# Patient Record
Sex: MALE | Race: WHITE | Employment: PART TIME | URBAN - METROPOLITAN AREA
[De-identification: names, ages, dates, MRNs, and addresses within clinical notes are randomized per-mention and may not be internally consistent; named-entity substitution may affect disease eponyms.]

---

## 2022-01-03 ENCOUNTER — VIRTUAL VISIT (OUTPATIENT)
Dept: PRIMARY CARE CLINIC | Age: 27
End: 2022-01-03
Payer: MEDICAID

## 2022-01-03 DIAGNOSIS — S82.891A CLOSED FRACTURE OF RIGHT ANKLE, INITIAL ENCOUNTER: Primary | ICD-10-CM

## 2022-01-03 PROCEDURE — 4004F PT TOBACCO SCREEN RCVD TLK: CPT | Performed by: STUDENT IN AN ORGANIZED HEALTH CARE EDUCATION/TRAINING PROGRAM

## 2022-01-03 PROCEDURE — G8421 BMI NOT CALCULATED: HCPCS | Performed by: STUDENT IN AN ORGANIZED HEALTH CARE EDUCATION/TRAINING PROGRAM

## 2022-01-03 PROCEDURE — G8427 DOCREV CUR MEDS BY ELIG CLIN: HCPCS | Performed by: STUDENT IN AN ORGANIZED HEALTH CARE EDUCATION/TRAINING PROGRAM

## 2022-01-03 PROCEDURE — 99203 OFFICE O/P NEW LOW 30 MIN: CPT | Performed by: STUDENT IN AN ORGANIZED HEALTH CARE EDUCATION/TRAINING PROGRAM

## 2022-01-03 PROCEDURE — G8484 FLU IMMUNIZE NO ADMIN: HCPCS | Performed by: STUDENT IN AN ORGANIZED HEALTH CARE EDUCATION/TRAINING PROGRAM

## 2022-01-03 SDOH — ECONOMIC STABILITY: FOOD INSECURITY: WITHIN THE PAST 12 MONTHS, YOU WORRIED THAT YOUR FOOD WOULD RUN OUT BEFORE YOU GOT MONEY TO BUY MORE.: NEVER TRUE

## 2022-01-03 SDOH — ECONOMIC STABILITY: FOOD INSECURITY: WITHIN THE PAST 12 MONTHS, THE FOOD YOU BOUGHT JUST DIDN'T LAST AND YOU DIDN'T HAVE MONEY TO GET MORE.: NEVER TRUE

## 2022-01-03 ASSESSMENT — PATIENT HEALTH QUESTIONNAIRE - PHQ9
SUM OF ALL RESPONSES TO PHQ QUESTIONS 1-9: 0
SUM OF ALL RESPONSES TO PHQ QUESTIONS 1-9: 0
1. LITTLE INTEREST OR PLEASURE IN DOING THINGS: 0
SUM OF ALL RESPONSES TO PHQ9 QUESTIONS 1 & 2: 0
SUM OF ALL RESPONSES TO PHQ QUESTIONS 1-9: 0
SUM OF ALL RESPONSES TO PHQ QUESTIONS 1-9: 0
2. FEELING DOWN, DEPRESSED OR HOPELESS: 0

## 2022-01-03 ASSESSMENT — SOCIAL DETERMINANTS OF HEALTH (SDOH): HOW HARD IS IT FOR YOU TO PAY FOR THE VERY BASICS LIKE FOOD, HOUSING, MEDICAL CARE, AND HEATING?: NOT HARD AT ALL

## 2022-01-03 ASSESSMENT — ENCOUNTER SYMPTOMS
WHEEZING: 0
SHORTNESS OF BREATH: 0
ABDOMINAL PAIN: 0

## 2022-01-03 NOTE — PROGRESS NOTES
Brandon Bowman (:  1995) is a 32 y.o. male,New patient, here for evaluation of the following chief complaint(s): New Patient (Np to establish ) and Ankle Injury (R ankle injured x months, requesting for PT referral.)         ASSESSMENT/PLAN:  1. Closed fracture of right ankle, initial encounter  -     [address]      Return if symptoms worsen or fail to improve. SUBJECTIVE/OBJECTIVE:  Patient presents requesting for referral to PT. Patient reports that he needs a primary provider to approve referral to PT. Back in 2021, he fell off stairs, fractured right ankle and broke left hand. He did undergo hand surgery but was placed on non weight bearing R ankle. He just came off non weight bearing a month ago and now needs PT. Surgeon is out of network but his insurance will cover for [employer]. Patient denies any pain currently. He is able to walk around wearing a parish. He has follow up appointment with Dr. Ronit Sykes (surgeon at [facility]) on 2022. Review of Systems   Constitutional: Negative for fever. Respiratory: Negative for shortness of breath and wheezing. Cardiovascular: Negative for chest pain and palpitations. Gastrointestinal: Negative for abdominal pain. Patient-Reported Vitals 1/3/2022   Patient-Reported Weight 275lb   Patient-Reported Height 6 2        Physical Exam  Constitutional:       General: He is not in acute distress. Appearance: Normal appearance. He is not ill-appearing or toxic-appearing. Neurological:      Mental Status: He is oriented to person, place, and time. Psychiatric:         Behavior: Behavior normal.           Brandon Bowman, was evaluated through a synchronous (real-time) audio-video encounter. The patient (or guardian if applicable) is aware that this is a billable service. Verbal consent to proceed was obtained today.  The visit was conducted pursuant to the emergency declaration under the [address] and the [address] authority and the [employer] ([employer]) and "[employer]ar General Act. Patient identification was verified, and a caregiver was present when appropriate. The patient was located in a state where the provider was credentialed to provide care. This dictation was generated by voice recognition computer software. Although all attempts are made to edit the dictation for accuracy, there may be errors in the transcription that are not intended. An electronic signature was used to authenticate this note.     --Jacqulynn Fothergill, MD

## 2022-01-04 ENCOUNTER — TELEPHONE (OUTPATIENT)
Dept: PRIMARY CARE CLINIC | Age: 27
End: 2022-01-04

## 2022-01-04 NOTE — TELEPHONE ENCOUNTER
Patient's girlfriend, Emerald Dietrich called in yesterday stating that they tried to schedule with PT and was told that our office needs to obtain a prior authorization for the PT eval.    I spoke to [facility] at [facility] today and initiated the PA, all pertinent information was given and the case is pending nurse review, this could take up to 48 hours. The pending reference [id number].

## 2022-01-12 ENCOUNTER — HOSPITAL ENCOUNTER (OUTPATIENT)
Dept: PHYSICAL THERAPY | Age: 27
Setting detail: THERAPIES SERIES
Discharge: HOME | End: 2022-01-12
Payer: MEDICAID

## 2022-01-12 PROCEDURE — 97161 PT EVAL LOW COMPLEX 20 MIN: CPT

## 2022-01-12 PROCEDURE — 97140 MANUAL THERAPY 1/> REGIONS: CPT

## 2022-01-12 PROCEDURE — 97110 THERAPEUTIC EXERCISES: CPT

## 2022-01-12 PROCEDURE — 97116 GAIT TRAINING THERAPY: CPT

## 2022-01-12 NOTE — FLOWSHEET NOTE
Sets/time Reps Notes / Cues   See HEP BELOW  20'                                                                    Therapeutic Activities (25554)       GAIT TRAINING (Without walking boot) In // bars: had pt practice wt shifting and then walking holding one bar in stocking feet working on heel strike and step thru 10'  1/12/22 Also advised pt on how to gradually wean out of the walking boot and to practice walking in his gym shoes at home                        Neuromuscular Re-ed (77554)                            Manual Intervention ([id number]) x 10'              PROM & gentle OP R ankle DF/PF, INV/EV 5'     Joint mobs Gr II-III distraction and post talar glide; metatarsal glides  5'     STM / IASTM                         Modalities:     Pt. 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  Date: 01/12/2022  Prepared by:  Monica Alarcon  Exercises  Seated Calf Towel Stretch - 2 x daily - 7 x weekly - 5 reps - 20-30 seconds hold  Seated Heel Raise - 1-2 x daily - 5-7 x weekly - 2-3 sets - 10 reps  Seated Heel Toe Raises - 1-2 x daily - 5-7 x weekly - 2-3 sets - 10 reps  Seated Knee Flexion Stretch - 1-2 x daily - 5-7 x weekly - 10 reps - 10 seconds hold  Standing Weight Shift - 1-2 x daily - 5-7 x weekly - 2-3 sets - 10 reps  Standing Weight Shifting Forward and Backward (in stagger stance) - 1-2 x daily - 7 x weekly - 2-3 sets - 10 reps             Therapeutic Exercise and NMR EXR  [x] (36662) Provided verbal/tactile cueing for activities related to strengthening, flexibility, endurance, ROM for improvements in LE, proximal hip, and core control with self care, mobility, lifting, ambulation.  [] (92601) Provided verbal/tactile cueing for activities related to improving balance, coordination, kinesthetic sense, posture, motor skill, proprioception  to assist with LE, proximal hip, and core control in self care, mobility, lifting, ambulation and eccentric single leg control.   [] (97305) Therapist is in constant attendance of 2 or more patients providing skilled therapy interventions, but not providing any significant amount of measurable one-on-one time to either patient, for improvements in LE, proximal hip, and core control in self care, mobility, lifting, ambulation and eccentric single leg control.      NMR and Therapeutic Activities:    [] (51935 or 48985) Provided verbal/tactile cueing for activities related to improving balance, coordination, kinesthetic sense, posture, motor skill, proprioception and motor activation to allow for proper function of core, proximal hip and LE with self care and ADLs  [] (69698) Gait Re-education- Provided training and instruction to the patient for proper LE, core and proximal hip recruitment and positioning and eccentric body weight control with ambulation re-education including up and down stairs     Home Exercise Program:    [x] (72856) Reviewed/Progressed HEP activities related to strengthening, flexibility, endurance, ROM of core, proximal hip and LE for functional self-care, mobility, lifting and ambulation/stair navigation   [] (26971)Reviewed/Progressed HEP activities related to improving balance, coordination, kinesthetic sense, posture, motor skill, proprioception of core, proximal hip and LE for self care, mobility, lifting, and ambulation/stair navigation      Manual Treatments:  PROM / STM / Oscillations-Mobs:  G-I, II, III, IV (PA's, Inf., Post.)  [x] (96665) Provided manual therapy to mobilize LE, proximal hip and/or LS spine soft tissue/joints for the purpose of modulating pain, promoting relaxation,  increasing ROM, reducing/eliminating soft tissue swelling/inflammation/restriction, improving soft tissue extensibility and allowing for proper ROM for normal function with self care, mobility, lifting and ambulation. Modalities:  [] (25297) Vasopneumatic compression: Utilized vasopneumatic compression to decrease edema / swelling for the purpose of improving mobility and quad tone / recruitment which will allow for increased overall function including but not limited to self-care, transfers, ambulation, and ascending / descending stairs. Charges:  Timed Code Treatment Minutes: 40   Total Treatment Minutes: 65     [x] EVAL - LOW (38139)   [] EVAL - MOD (04721)  [] EVAL - HIGH (32928)  [] RE-EVAL (20250)  [x] GS(72369) x  1     [] Ionto  [] NMR (15874) x       [] Vaso  [x] Manual (36218) x  1     [] Ultrasound  [] TA x        [] Mech Traction (05690)  [] Aquatic Therapy x      [] ES (un) (83647):   [] Home Management Training x  [] ES(attended) (25884)   [] Dry Needling 1-2 muscles (56612):  [] Dry Needling 3+ muscles (061828  [] Group:      [x] Other: GAIT TRAINING (77371)    GOALS:   Patient stated goal: regain strength in order to return to work  []? Progressing: []? Met: []? Not Met: []? Adjusted     Therapist goals for Patient:   Short Term Goals: To be achieved in: 2 weeks  1. Independent in HEP and progression per patient tolerance, in order to prevent re-injury. []? Progressing: []? Met: []? Not Met: []? Adjusted  2. Patient will have a decrease in pain of R ankle and decreased fear of wt bearing so he can walk in a normal gym shoe to facilitate improvement in movement, function, and ADLs as indicated by Functional Deficits. []? Progressing: []? Met: []? Not Met: []? Adjusted     Long Term Goals: To be achieved in: 6-8 weeks  1. Disability index score of 20% or less for the LEFS to assist with reaching prior level of function. []? Progressing: []? Met: []? Not Met: []? Adjusted  2.  Patient will demonstrate increased AROM of R ankle DF and PF by at least 10 deg to allow for proper joint functioning for correct heel strike and push off during gait  []? Progressing: []? Met: []? Not Met: []? Adjusted  3. Patient will demonstrate an increase in Strength to at least 4+/5 in R ankle as well as good proximal hip strength and control to allow ability to ascend/descend stairs and ladders without difficulty  []? Progressing: []? Met: []? Not Met: []? Adjusted  4. Patient will return to functional activities including being able to stand at least 2 hours without increased symptoms or restriction. []? Progressing: []? Met: []? Not Met: []? Adjusted  5. Pt will be able to squat and lift heavier objects and walk without significant gait deviation    []? Progressing: []? Met: []? Not Met: []? Adjusted            Overall Progression Towards Functional goals/ Treatment Progress Update:  [] Patient is progressing as expected towards functional goals listed. [] Progression is slowed due to complexities/Impairments listed. [] Progression has been slowed due to co-morbidities. [x] Plan just implemented, too soon to assess goals progression <30days   [] Goals require adjustment due to lack of progress  [] Patient is not progressing as expected and requires additional follow up with physician  [] Other    Persisting Functional Limitations/Impairments:  []Sitting []Standing   [x]Walking []Stairs   [x]Transfers []ADLs   [x]Squatting/bending []Kneeling  [x]Housework []Job related tasks  [x]Driving []Sports/Recreation   []Sleeping []Other:    ASSESSMENT:  AT [facility] 1/12/22:  Pt presents to PT 4 months s/p sustaining a R ankle talus fracture. He has been immobilized all this time and he had a difficult time finding a PT clinic that accepted his insurance so he hasn't been doing any exercises or progressive functional wt bearing without the walking boot.  His pain levels are mostly tolerable but he is somewhat fearful of full wt bearing on the R ankle without the boot. He exhibits stiffness/hypomobility in TCJ ans subtalar jts and also 1st MTP along with decreased strength and proprioception and decreased ambulation status. 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 PT 2x / week for 6-8 weeks. [] Continue per plan of care [] Alter current plan (see comments)  [x] Plan of care initiated [] Hold pending MD visit [] Discharge    Electronically signed by: Torres Yousif, PT / DPT      Note: If patient does not return for scheduled/ recommended follow up visits, this note will serve as a discharge from care along with most recent update on progress.

## 2022-01-12 NOTE — PLAN OF CARE
[address], [address] [facility], [address]  Phone: [phone]   Fax: [phone]                                                       Physical Therapy Evaluation/Certification    Dear Referring Practitioner: Fabby Rizzo MD,    We had the pleasure of evaluating the following patient for physical therapy services at [address] [place] [address]. A summary of our findings can be found in the initial assessment below. This includes our plan of care. If you have any questions or concerns regarding these findings, please do not hesitate to contact me at the office phone number checked above.   Thank you for the referral.       Physician Signature:_______________________________Date:__________________  By signing above (or electronic signature), therapists plan is approved by physician      Patient: Luis Enrique Chaudhary   : 1995   MRN: [id number]  Referring Physician: Referring Practitioner: Fabby Rizzo MD (also send copy of notes to ortho Radha Valentin MD: phone # [phone])       Evaluation Date: 2022      Medical Diagnosis Information:  Diagnosis: J49.156W (ICD-10-CM) - Closed fracture of right ankle, initial encounter   Treatment Diagnosis: R ankle pain, decreased ROM/ flexibility, decreased strength, decreased weight bearing, decreased balance and ambulation s/p R talar fracture and immobilization                                         Insurance information: PT Insurance Information: Pose; all codes billable; 30 PT pcy     Precautions/ Contra-indications:  By  MD wanted him to be in a high top shoe but pt hasn't been able to get into PT due to insurance so pt is still full-time in walking boot  Latex Allergy:  [x]NO      []YES  Preferred Language for Healthcare:   [x]English       []Other:    C-SSRS Triggered by Intake questionnaire (Past 2 wk assessment ):   [x] No, Questionnaire did not trigger screening.   [] Yes, Patient intake triggered C-SSRS Screening     [] Completed, no further action required. [] Completed, PCP notified via Epic    SUBJECTIVE: Patient stated complaint: Pt injured 9/21/21 when he was going downstairs at his apartment and the stair broke and he fell through and fractured his R talus and his 4th metacarpal of his R hand. The talus fracture was managed non-operatively but an ORIF was done on his R hand. Pt was NWB on RLE for 12 wks with a platform walker. Pt is now in a walking boot WBAT. He had a couple sessions of home PT after he came home from hospital and he was given a couple ROM exercises to do but he hasn't been consistent with them lately He has tried to get into outpatient PT but has had problems getting approved at other places thru his insurance and finally got the ok to come here. He has not has not attempted to wean out of the brace because he was waiting to start PT. His current standing tolerance is about 20 minutes. Pt wants to be able to get back to his job. 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 Likes to hike    Living Status/Prior Level of Function:Prior to this injury / incident, pt was independent with ADLs and IADLs. Pt is using shower chair and when he is out of boot his wife has to help him get up because he has been fearful of WBng on RLE without the boot.  Pt is not yet driving due to being in the boot.       OBJECTIVE:   Palpation: mild TTP over R Achilles, stiffness in ankle    Functional Mobility/Transfers: IND w/ boot on    Posture: B hip ER and genu recurvatum    Bandages/Dressings/Incisions: n/a    Gait: (include devices/WB status)     Dermatomes Normal Abnormal Comments   inguinal area (L1)    ALL NORMAL   anterior mid-thigh (L2)      distal ant thigh/med knee (L3)      medial lower leg and foot (L4)      lateral lower leg and foot (L5)      posterior calf (S1)      medial calcaneus (S2)          Reflexes Normal Abnormal Comments   S1-2 Seated achilles   Not tested   S1-2 Prone knee bend      L3-4 Patellar tendon      Clonus      Babinski        Lumbar AROM screen: [] WFL  [] abnormal:     PROM AROM    L R L R   Hip Flexion   WNL WNL   Hip Abduction       Hip ER       Hip IR       Knee Flexion   WNL WNL   Knee Extension       Dorsiflexion   5* 8 2   Plantarflexion   50 50 42   Inversion   30 30 18   Eversion   28 25 25       Strength (0-5) / Myotomes Left Right   Hip Flexion - supine     Hip Flexion - seated (L1-2)     Hip Abduction     Hip Adduction     Hip ER     Hip IR     Quads (L2-4)     Hamstrings     Ankle Dorsiflexion (L4-5) 5 4   Ankle Plantarflexion (S1-2) 5 3-   Ankle Inversion 5 4   Ankle Eversion (S1-2) 5 4   Great Toe Extension (L5)          Flexibility     Hamstrings (90/90) -20 -30   ITB (Katalina)     Quads (Ely's)     Hip Flexor Ahmed Elm)          Girth     Mid patella     Suprapatellar     Figure 8     Transmalleolar     Metatarsal Heads         Joint mobility: dec post R talar glide limiting DF   []Normal    [x]Hypo   []Hyper    Orthopaedic Special Tests  Positive  Negative  NT Comments    Hip       KYLIE / Wally's       FADIR       Scour       Trendelenburg              Knee       Lachman's / Anterior Drawer       Posterior Drawer       Varus Stress       Valgus Stress       Darin's        Appley's       Thessaly's       Patellar Tracking              Ankle Anterior Drawer  x     Talar Tilt  x     Willis       Chago's   x                Balance: unable to test in SLB today                         [x] Patient history, allergies, meds reviewed. Medical chart reviewed. See intake form. Review Of Systems (ROS):  [x]Performed Review of systems (Integumentary, CardioPulmonary, Neurological) by intake and observation. Intake form has been scanned into medical record. Patient has been instructed to contact their primary care physician regarding ROS issues if not already being addressed at this time.       Co-morbidities/Complexities (which will affect course of rehabilitation):   []None        [x]Hx of COVID   Arthritic conditions   []Rheumatoid arthritis (M05.9)  []Osteoarthritis (M19.91)  []Gout   Cardiovascular conditions   []Hypertension (I10)  []Hyperlipidemia (E78.5)  []Angina pectoris (I20)  []Atherosclerosis (I70)  []Pacemaker  []Hx of CABG/stent/  cardiac surgeries   Musculoskeletal conditions   []Disc pathology   []Congenital spine pathologies   []Osteoporosis (M81.8)  []Osteopenia (M85.8)  []Scoliosis       Endocrine conditions   []Hypothyroid (E03.9)  []Hyperthyroid Gastrointestinal conditions   []Constipation (L39.28)   Metabolic conditions   [x]Morbid obesity (E66.01)  []Diabetes type 1(E10.65) or 2 (E11.65)   []Neuropathy (G60.9)     Cardio/Pulmonary conditions   []Asthma (J45)  []Coughing   []COPD (J44.9)  []CHF  []A-fib   Psychological Disorders  []Anxiety (F41.9)  []Depression (F32.9)   []Other:   Developmental Disorders  []Autism (F84.0)  []CP (G80)  []Down Syndrome (Q90.9)  []Developmental delay     Neurological conditions  []Prior Stroke (I69.30)  []Parkinson's (G20)  []Encephalopathy (G93.40)  []MS (G35)  []Post-polio (G14)  []SCI  []TBI  []ALS Other conditions  []Fibromyalgia (M79.7)  []Vertigo  []Syncope  []Kidney Failure  []Cancer      []currently undergoing                treatment  []Pregnancy  []Incontinence   Prior surgeries  []involved limb  []previous spinal surgery  [] section birth  []hysterectomy  []bowel / bladder surgery  []other relevant surgeries   []Other:              Barriers to/and or personal factors that will affect rehab potential:              []Age  []Sex    [x]Smoker              []Motivation/Lack of Motivation                        []Co-Morbidities              []Cognitive Function, education/learning barriers              []Environmental, home barriers              [x]profession/work barriers  []past PT/medical experience  []other:  Justification:     Falls Risk Assessment (30 days):    [x] Falls Risk assessed and no intervention required. [] Falls Risk assessed and Patient requires intervention due to being higher risk   TUG score (>12s at risk):     [] Falls education provided, including        ASSESSMENT: AT [facility]:  Pt presents to PT 4 months s/p sustaining a R ankle talus fracture. He has been immobilized all this time and he had a difficult time finding a PT clinic that accepted his insurance so he hasn't been doing any exercises or progressive functional wt bearing without the walking boot. His pain levels are mostly tolerable but he is somewhat fearful of full wt bearing on the R ankle without the boot. He exhibits stiffness/hypomobility in TCJ ans subtalar jts and also 1st MTP along with decreased strength and proprioception and decreased ambulation status.   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)    Functional Impairments:     []Noted lumbar/proximal hip/LE joint hypomobility   [x]Decreased LE functional ROM   []Decreased core/proximal hip strength and neuromuscular control   [x]Decreased LE functional strength   [x]Reduced balance/proprioceptive control   []other:      Functional Activity Limitations (from functional questionnaire and intake)   [x]Reduced ability to tolerate prolonged functional positions   []Reduced ability or difficulty with changes of positions or transfers between positions   []Reduced ability to maintain good posture and demonstrate good body mechanics with sitting, bending, and lifting   []Reduced ability to sleep   [] Reduced ability or tolerance with driving and/or computer work   []Reduced ability to perform lifting, carrying tasks   [x]Reduced ability to squat   []Reduced ability to forward bend   [x]Reduced ability to ambulate prolonged functional periods/distances/surfaces   []Reduced ability to ascend/descend stairs   []Reduced ability to run, hop, cut or jump   []other:    Participation Restrictions   []Reduced participation in self care activities   [x]Reduced participation in home management activities   [x]Reduced participation in work activities   [x]Reduced participation in social activities. [x]Reduced participation in sport/recreation activities. Classification :    []Signs/symptoms consistent with post-surgical status including decreased ROM, strength and function.    []Signs/symptoms consistent with joint sprain/strain   []Signs/symptoms consistent with patella-femoral syndrome   []Signs/symptoms consistent with knee OA/hip OA   []Signs/symptoms consistent with internal derangement of knee/Hip   []Signs/symptoms consistent with functional hip weakness/NMR control      []Signs/symptoms consistent with tendinitis/tendinosis    []signs/symptoms consistent with pathology which may benefit from Dry needling      [x]other: signs/symptoms consistent with ankle fracture and prolonged immobilization     Prognosis/Rehab Potential:      []Excellent   [x]Good    []Fair   []Poor    Tolerance of evaluation/treatment:    []Excellent   [x]Good    []Fair   []Poor    Physical Therapy Evaluation Complexity Justification  [x] A history of present problem with:  [] no personal factors and/or comorbidities that impact the plan of care;  [x]1-2 personal factors and/or comorbidities that impact the plan of care  []3 personal factors and/or comorbidities that impact the plan of care  [x] An examination of body systems using standardized tests and measures addressing any of the following: body structures and functions (impairments), activity limitations, and/or participation restrictions;:  [x] a total of 1-2 or more elements   [] a total of 3 or more elements   [] a total of 4 or more elements   [x] A clinical presentation with:  [x] stable and/or uncomplicated characteristics   [] evolving clinical presentation with changing characteristics  [] unstable and unpredictable characteristics;   [x] Clinical decision making of [x] Low, [] moderate, [] high complexity using standardized patient assessment instrument and/or measurable assessment of functional outcome. [x] EVAL (LOW) 67599 (typically 15 minutes face-to-face)  [] EVAL (MOD) 88417 (typically 30 minutes face-to-face)  [] EVAL (HIGH) 32990 (typically 45 minutes face-to-face)  [] RE-EVAL     PLAN:   Frequency/Duration:  2 days per week for 8 Weeks:  Interventions:  [x]  Therapeutic exercise including: strength training, ROM, for Lower extremity and core   [x]  NMR activation and proprioception for LE, Glutes and Core   [x]  Manual therapy as indicated for LE, Hip and spine to include: Dry Needling/IASTM, STM, PROM, Gr I-IV mobilizations, manipulation. [x] Modalities as needed that may include: thermal agents, E-stim, Biofeedback, US, iontophoresis as indicated  [x] Patient education on joint protection, postural re-education, activity modification, progression of HEP. HEP instruction:   Access Code: [email or web address]  URL: [email or web address]/  Date: 01/12/2022  Prepared by:  Monica Alarcon  Exercises  Seated Calf Towel Stretch - 2 x daily - 7 x weekly - 5 reps - 20-30 seconds hold  Seated Heel Raise - 1-2 x daily - 5-7 x weekly - 2-3 sets - 10 reps  Seated Heel Toe Raises - 1-2 x daily - 5-7 x weekly - 2-3 sets - 10 reps  Seated Knee Flexion Stretch - 1-2 x daily - 5-7 x weekly - 10 reps - 10 seconds hold  Standing Weight Shift - 1-2 x daily - 5-7 x weekly - 2-3 sets - 10 reps  Standing Weight Shifting Forward and Backward - 1-2 x daily - 7 x weekly - 2-3 sets - 10 reps       GOALS:  Patient stated goal: regain strength in order to return to work  [] Progressing: [] Met: [] Not Met: [] Adjusted    Therapist goals for Patient:   Short Term Goals: To be achieved in: 2 weeks  1. Independent in HEP and progression per patient tolerance, in order to prevent re-injury. [] Progressing: [] Met: [] Not Met: [] Adjusted  2. Patient will have a decrease in pain of R ankle and decreased fear of wt bearing so he can walk in a normal gym shoe to facilitate improvement in movement, function, and ADLs as indicated by Functional Deficits. [] Progressing: [] Met: [] Not Met: [] Adjusted    Long Term Goals: To be achieved in: 6-8 weeks  1. Disability index score of 20% or less for the LEFS to assist with reaching prior level of function. [] Progressing: [] Met: [] Not Met: [] Adjusted  2. Patient will demonstrate increased AROM of R ankle DF and PF by at least 10 deg to allow for proper joint functioning for correct heel strike and push off during gait  [] Progressing: [] Met: [] Not Met: [] Adjusted  3. Patient will demonstrate an increase in Strength to at least 4+/5 in R ankle as well as good proximal hip strength and control to allow ability to ascend/descend stairs and ladders without difficulty  [] Progressing: [] Met: [] Not Met: [] Adjusted  4. Patient will return to functional activities including being able to stand at least 2 hours without increased symptoms or restriction. [] Progressing: [] Met: [] Not Met: [] Adjusted  5. Pt will be able to squat and lift heavier objects and walk without significant gait deviation    [] Progressing: [] Met: [] Not Met: [] Adjusted     Electronically signed by:   Monica Moreno Pena, PT/ DPT

## 2022-01-28 ENCOUNTER — HOSPITAL ENCOUNTER (OUTPATIENT)
Dept: PHYSICAL THERAPY | Age: 27
Setting detail: THERAPIES SERIES
Discharge: HOME | End: 2022-01-28
Payer: MEDICAID

## 2022-01-28 PROCEDURE — 97140 MANUAL THERAPY 1/> REGIONS: CPT | Performed by: SPECIALIST/TECHNOLOGIST

## 2022-01-28 PROCEDURE — 97110 THERAPEUTIC EXERCISES: CPT | Performed by: SPECIALIST/TECHNOLOGIST

## 2022-01-28 NOTE — FLOWSHEET NOTE
[address] [facility]  Phone: [phone]   Fax: [phone]    Physical Therapy Treatment Note/ Progress Report:     Date:  2022    Patient Name:  Lynda Baldwin    :  1995  MRN: [id number]  Restrictions/Precautions:    Medical/Treatment Diagnosis Information:  Diagnosis: F67.680N (ICD-10-CM) - Closed fracture of right ankle DOI 21  Treatment Diagnosis: R ankle pain, decreased ROM/ flexibility, decreased strength, decreased weight bearing, decreased balance and ambulation s/p R talar fracture and immobilization  Insurance/Certification information:  PT Insurance Information: [email or web address]; all codes billable; 30 PT pcy (needs AUTH FOR F/U SESSIONS)  Physician Information:  Referring Practitioner: Anju Moses MD   (also send notes to ortho doc Summer Taveras, phone #[phone])    Plan of care signed (Y/N): []  Yes [x]  No     Date of Patient follow up with Physician: Dr Rian Cedillo surgeon  & PCP Anju Moses     Progress Report: []  Yes  [x]  No     Date Range for reporting period:  Beginnin22  Ending:      Progress report due (10 Rx/or 30 days whichever is less):   (date)     Recertification due (POC duration/ or 90 days whichever is less): 3/22/22    Visit # Insurance Allowable Auth required? Date Range   2 30 [x]  Yes  []  No pcy       Units approved Units used Date Range             Latex Allergy:  [x]NO      []YES  Preferred Language for Healthcare:   [x]English       []other:    Functional Scale:        Date assessed:  LEFS: raw score = 39; dysfunction = 51%   22    Pain level:   0/10 R ankle     SUBJECTIVE:  Pt reports not sleeping well with ankle but his biggest issue is with the achilles. Pt has some lateral ankle pain couple times a week but this is generally related to performing stairs. Taking tylenol PRN but not icing ankle.       OBJECTIVE:    Pt reports entering clinic in high SplitSecnd sneakers with no AD and mild antalgia. Leaving clinic, patient was little more tenative with more antalgia present, with leaning more on left leg. RESTRICTIONS/PRECAUTIONS: Pt is allowed to be FWB. MD wanted him to be in a high top shoe by Jan 7,  but pt hasn't been able to get into PT due to insurance issues so pt is still full-time in walking boot    Exercises/Interventions: 25'  Therapeutic Exercise (20403)  25' Resistance / level Sets/time Reps Notes / Cues   TP    IB in standing  20\" 3x ea 1/28   ANKLE ROM  Alphabet  DF/PF   INV/ EV  20x   1/28   BAPS board DF/ PF  InV/ EV  20x ea  1/28   TB x 3 D directions lime 10x 2 1/28   TR/ HR  Toes up/ heels up  seated   10x 2 1/28                                      Therapeutic Activities (26018) 5'       Gait walking FWB, No AD 5'   5' advised walking norm heel/ toe pattern w/ AD and avoiding foot at ER position                 Neuromuscular Re-ed (61769)       Tandem stance  Floor    NPV                 Manual Intervention (70883) x 18'              PROM & gentle OP R ankle DF/PF, INV/EV 5'   PROM in all planes   Joint mobs Gr II-III distraction and post talar glide; metatarsal glides  5'   in HL and in prone  To TC joint- gentle grade 1   STM  8'   1/28 achilles/ medial gastroc                     Modalities:     Pt. Education:  1/12/22: Lencho Lu pt he can start driving once out of the boot and if he is no longer on narcotic pain meds  -pt educated on diagnosis, prognosis and expectations for rehab  -all pt questions were answered    Home Exercise Program:  Patient was instructed in the following exercises, performed them in the clinic and was issued a handout:     Access Code: [email or web address]  URL: [email or web address]  Date: 01/12/2022  Prepared by:  Monica Alarcon  Exercises  Seated Calf Towel Stretch - 2 x daily - 7 x weekly - 5 reps - 20-30 seconds hold  Seated Heel Raise - 1-2 x daily - 5-7 x weekly - 2-3 sets - 10 reps  Seated Heel Toe Raises - 1-2 x daily - 5-7 x weekly - 2-3 sets - 10 reps  Seated Knee Flexion Stretch - 1-2 x daily - 5-7 x weekly - 10 reps - 10 seconds hold  Standing Weight Shift - 1-2 x daily - 5-7 x weekly - 2-3 sets - 10 reps  Standing Weight Shifting Forward and Backward (in stagger stance) - 1-2 x daily - 7 x weekly - 2-3 sets - 10 reps             Therapeutic Exercise and NMR EXR  [x] (26569) Provided verbal/tactile cueing for activities related to strengthening, flexibility, endurance, ROM for improvements in LE, proximal hip, and core control with self care, mobility, lifting, ambulation.  [] (57859) Provided verbal/tactile cueing for activities related to improving balance, coordination, kinesthetic sense, posture, motor skill, proprioception  to assist with LE, proximal hip, and core control in self care, mobility, lifting, ambulation and eccentric single leg control.   [] (07880) Therapist is in constant attendance of 2 or more patients providing skilled therapy interventions, but not providing any significant amount of measurable one-on-one time to either patient, for improvements in LE, proximal hip, and core control in self care, mobility, lifting, ambulation and eccentric single leg control.      NMR and Therapeutic Activities:    [] (48786 or 71545) Provided verbal/tactile cueing for activities related to improving balance, coordination, kinesthetic sense, posture, motor skill, proprioception and motor activation to allow for proper function of core, proximal hip and LE with self care and ADLs  [] (62282) Gait Re-education- Provided training and instruction to the patient for proper LE, core and proximal hip recruitment and positioning and eccentric body weight control with ambulation re-education including up and down stairs     Home Exercise Program:    [x] (39983) Reviewed/Progressed HEP activities related to strengthening, flexibility, endurance, ROM of core, proximal hip and LE for functional self-care, mobility, lifting and ambulation/stair navigation   [] (64020)Reviewed/Progressed HEP activities related to improving balance, coordination, kinesthetic sense, posture, motor skill, proprioception of core, proximal hip and LE for self care, mobility, lifting, and ambulation/stair navigation      Manual Treatments:  PROM / STM / Oscillations-Mobs:  G-I, II, III, IV (PA's, Inf., Post.)  [x] (73735) Provided manual therapy to mobilize LE, proximal hip and/or LS spine soft tissue/joints for the purpose of modulating pain, promoting relaxation,  increasing ROM, reducing/eliminating soft tissue swelling/inflammation/restriction, improving soft tissue extensibility and allowing for proper ROM for normal function with self care, mobility, lifting and ambulation. Modalities:  [] (39321) Vasopneumatic compression: Utilized vasopneumatic compression to decrease edema / swelling for the purpose of improving mobility and quad tone / recruitment which will allow for increased overall function including but not limited to self-care, transfers, ambulation, and ascending / descending stairs. declined  Ice 1/28    Charges:  Timed Code Treatment Minutes: 48   Total Treatment Minutes: 48     [] EVAL - LOW (19685)   [] EVAL - MOD (46289)  [] EVAL - HIGH (37602)  [] RE-EVAL (13185)  [x] QP(60440) x  2     [] Ionto  [] NMR (74849) x       [] Vaso  [x] Manual (68455) x  1     [] Ultrasound  [] TA x        [] Mech Traction (15967)  [] Aquatic Therapy x      [] ES (un) (93981):   [] Home Management Training x  [] ES(attended) (29384)   [] Dry Needling 1-2 muscles (61766):  [] Dry Needling 3+ muscles (778758  [] Group:      [] Other: GAIT TRAINING (94493)    GOALS:   Patient stated goal: regain strength in order to return to work  []? Progressing: []? Met: []? Not Met: []? Adjusted     Therapist goals for Patient:   Short Term Goals: To be achieved in: 2 weeks  1. Independent in HEP and progression per patient tolerance, in order to prevent re-injury.    []? Progressing: []? Met: []? Not Met: []? Adjusted  2. Patient will have a decrease in pain of R ankle and decreased fear of wt bearing so he can walk in a normal gym shoe to facilitate improvement in movement, function, and ADLs as indicated by Functional Deficits. []? Progressing: []? Met: []? Not Met: []? Adjusted     Long Term Goals: To be achieved in: 6-8 weeks  1. Disability index score of 20% or less for the LEFS to assist with reaching prior level of function. []? Progressing: []? Met: []? Not Met: []? Adjusted  2. Patient will demonstrate increased AROM of R ankle DF and PF by at least 10 deg to allow for proper joint functioning for correct heel strike and push off during gait  []? Progressing: []? Met: []? Not Met: []? Adjusted  3. Patient will demonstrate an increase in Strength to at least 4+/5 in R ankle as well as good proximal hip strength and control to allow ability to ascend/descend stairs and ladders without difficulty  []? Progressing: []? Met: []? Not Met: []? Adjusted  4. Patient will return to functional activities including being able to stand at least 2 hours without increased symptoms or restriction. []? Progressing: []? Met: []? Not Met: []? Adjusted  5. Pt will be able to squat and lift heavier objects and walk without significant gait deviation    []? Progressing: []? Met: []? Not Met: []? Adjusted            Overall Progression Towards Functional goals/ Treatment Progress Update:  [] Patient is progressing as expected towards functional goals listed. [] Progression is slowed due to complexities/Impairments listed. [] Progression has been slowed due to co-morbidities.   [x] Plan just implemented, too soon to assess goals progression <30days   [] Goals require adjustment due to lack of progress  [] Patient is not progressing as expected and requires additional follow up with physician  [] Other    Persisting Functional Limitations/Impairments:  []Sitting []Standing   [x]Walking []Stairs   [x]Transfers []ADLs   [x]Squatting/bending []Kneeling  [x]Housework []Job related tasks  [x]Driving []Sports/Recreation   []Sleeping []Other:    ASSESSMENT:  Pt tolerated PROM/ manual therapy/ STM to gastroc and along achilles tendon today. Pt only had discomfort over the achilles with pushing towards end range DF. Pt program progressed with ankle mobility exercises, TB with strengthening performing all ROM in all 4 planes. Pt had no reports of increased pain with DF/ PF but has increased soreness with INv/ Eversion with significant muscle shaking and fair control. Pt denied pain with seated TR/ HR but had some increased gastroc tightness reported with using the IB in standing. Significant gastroc muscle atrophy noted between the Right and left legs. Pt overall has good ROM in all planes since coming out of the boot. Ambulation was challenged leaving clinic today so he was advised to resume using an AD to ambulate like a cane/ crutch. 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 Determine patient tolerance NPV to progressions with WB etc and TB. Advised to use cane for antalgia and with weather conditions not being ideal due to increased weakness in peroneals and general ankle deconditioning/ proprioceptive deficits. Advised ankle ROM 2x/ day and ok to start stationary bike 10 min sessions/ 2x day but use TB 1x/day.  Increase icing amounts  [x] Continue per plan of

## 2022-01-31 ENCOUNTER — HOSPITAL ENCOUNTER (OUTPATIENT)
Dept: PHYSICAL THERAPY | Age: 27
Setting detail: THERAPIES SERIES
Discharge: HOME | End: 2022-01-31
Payer: MEDICAID

## 2022-01-31 PROCEDURE — 97140 MANUAL THERAPY 1/> REGIONS: CPT

## 2022-01-31 PROCEDURE — 97110 THERAPEUTIC EXERCISES: CPT

## 2022-01-31 PROCEDURE — 97112 NEUROMUSCULAR REEDUCATION: CPT

## 2022-01-31 NOTE — FLOWSHEET NOTE
[address] [facility]  Phone: [phone]   Fax: [phone]    Physical Therapy Treatment Note/ Progress Report:     Date:  2022    Patient Name:  Sydnee Iyer    :  1995  MRN: [id number]  Restrictions/Precautions:    Medical/Treatment Diagnosis Information:  Diagnosis: Q80.136Z (ICD-10-CM) - Closed fracture of right ankle DOI 21  Treatment Diagnosis: R ankle pain, decreased ROM/ flexibility, decreased strength, decreased weight bearing, decreased balance and ambulation s/p R talar fracture and immobilization  Insurance/Certification information:  PT Insurance Information: [employer]; all codes billable; 30 PT pcy (needs AUTH FOR F/U SESSIONS)  Physician Information:  Referring Practitioner: Orlando Capone MD   (also send notes to ortho doc Lizzette Cabrera, phone #[phone])    Plan of care signed (Y/N): [x]  Yes 22  []  No     Date of Patient follow up with Physician: Dr Sanjana Garner surgeon  & PCP Orlando Capone     Progress Report: []  Yes  [x]  No     Date Range for reporting period:  Beginnin22  Ending:      Progress report due (10 Rx/or 30 days whichever is less):   (date)     Recertification due (POC duration/ or 90 days whichever is less): 3/22/22    Visit # Insurance Allowable Auth required? Date Range   3 30 [x]  Yes  []  No pcy       Units approved Units used Date Range   60  6  (as of 22) 22- 22      Latex Allergy:  [x]NO      []YES  Preferred Language for Healthcare:   [x]English       []other:    Functional Scale:        Date assessed:  LEFS: raw score = 39; dysfunction = 51%   22    Pain level:   0/10 R ankle     SUBJECTIVE:  Pt reports he is completely out of the walking boot now and wearing a high top sneaker. He gets a little increased pain the longer he's on his feet. Not on pain meds any longer.  Has not attempted driving yet  : Pt arrived 10 minutes late for appt.    OBJECTIVE:   1/28 Pt reports entering clinic in high top sneakers with no AD and mild antalgia. Leaving clinic, patient was little more tenative with more antalgia present, with leaning more on left leg. RESTRICTIONS/PRECAUTIONS: Pt is allowed to be FWB.    MD wanted him to be in a high top shoe by Jan 7,  but pt hasn't been able to get into PT due to insurance issues so pt is still full-time in walking boot    Exercises/Interventions:   Therapeutic Exercise (54530) x 15' Resistance / level Sets/time Reps Notes / Cues    IB stretch standing gastroc  soleus 30\"  30\" 2  1    HR-TR (double leg) standing 2 10    ANKLE ROM  Alphabet  DF/PF   INV/ EV --  --    BAPS board PWB RLE in standing  DF/PF, INV/EV  CW/CCW    15x ea  10x ea  1/31   R ankle 4 way TB (long sit) lime 2 10 R ea way 1/31   Seated EV to INV towel sweeps  3 10 R 1/31                                      Therapeutic Activities (42218)        Gait walking FWB, No AD 5'   5' advised walking norm heel/ toe pattern w/ AD and avoiding foot at ER position                 Neuromuscular Re-ed (20452) x 18'       Tandem stance  Level  Airex 10\"  10\" 2 R/L  2 R/L 1/31   Slow march on Airex  1 10 R/L 1/31   Fwd step up to SLS 4 inch 1/3\" 10 R 1/31   Lateral step up to SLS 4 inch 1/3\" 10 R 1/31   Chair squats to 23\"  1 10 1/31          Manual Intervention (64706) x 10'              PROM & gentle OP R ankle DF/PF, INV/EV 5'   PROM in all planes   Joint mobs Gr III distraction and post talar glide; metatarsal glides and subtalar glide to inc  EV/INV  5'  supine   STM  --   1/31 no time today                     Modalities:     Pt. Education:  1/12/22: Xin Boykin pt he can start driving once out of the boot and if he is no longer on narcotic pain meds  -pt educated on diagnosis, prognosis and expectations for rehab  -all pt questions were answered    Home Exercise Program:  Patient was instructed in the following exercises, performed them in the clinic and was recruitment and positioning and eccentric body weight control with ambulation re-education including up and down stairs     Home Exercise Program:    [] (81179) Reviewed/Progressed HEP activities related to strengthening, flexibility, endurance, ROM of core, proximal hip and LE for functional self-care, mobility, lifting and ambulation/stair navigation   [] (30223)Reviewed/Progressed HEP activities related to improving balance, coordination, kinesthetic sense, posture, motor skill, proprioception of core, proximal hip and LE for self care, mobility, lifting, and ambulation/stair navigation      Manual Treatments:  PROM / STM / Oscillations-Mobs:  G-I, II, III, IV (PA's, Inf., Post.)  [x] (56295) Provided manual therapy to mobilize LE, proximal hip and/or LS spine soft tissue/joints for the purpose of modulating pain, promoting relaxation,  increasing ROM, reducing/eliminating soft tissue swelling/inflammation/restriction, improving soft tissue extensibility and allowing for proper ROM for normal function with self care, mobility, lifting and ambulation. Modalities:  [] (46567) Vasopneumatic compression: Utilized vasopneumatic compression to decrease edema / swelling for the purpose of improving mobility and quad tone / recruitment which will allow for increased overall function including but not limited to self-care, transfers, ambulation, and ascending / descending stairs.     declined  Ice 1/28    Charges:  Timed Code Treatment Minutes: 43   Total Treatment Minutes: 43     [] EVAL - LOW (53619)   [] EVAL - MOD (38836)  [] EVAL - HIGH (04483)  [] RE-EVAL (89429)  [x] OS(61725) x  1     [] Ionto  [x] NMR (61268) x 1      [] Vaso  [x] Manual (52302) x  1     [] Ultrasound  [] TA x        [] Mech Traction (63579)  [] Aquatic Therapy x      [] ES (un) (22153):   [] Home Management Training x  [] ES(attended) (43570)   [] Dry Needling 1-2 muscles (79757):  [] Dry Needling 3+ muscles (846771  [] Group:      [] Other: GAIT TRAINING (76373)    GOALS:   Patient stated goal: regain strength in order to return to work  []? Progressing: []? Met: []? Not Met: []? Adjusted     Therapist goals for Patient:   Short Term Goals: To be achieved in: 2 weeks  1. Independent in HEP and progression per patient tolerance, in order to prevent re-injury. []? Progressing: []? Met: []? Not Met: []? Adjusted  2. Patient will have a decrease in pain of R ankle and decreased fear of wt bearing so he can walk in a normal gym shoe to facilitate improvement in movement, function, and ADLs as indicated by Functional Deficits. []? Progressing: []? Met: []? Not Met: []? Adjusted     Long Term Goals: To be achieved in: 6-8 weeks  1. Disability index score of 20% or less for the LEFS to assist with reaching prior level of function. []? Progressing: []? Met: []? Not Met: []? Adjusted  2. Patient will demonstrate increased AROM of R ankle DF and PF by at least 10 deg to allow for proper joint functioning for correct heel strike and push off during gait  []? Progressing: []? Met: []? Not Met: []? Adjusted  3. Patient will demonstrate an increase in Strength to at least 4+/5 in R ankle as well as good proximal hip strength and control to allow ability to ascend/descend stairs and ladders without difficulty  []? Progressing: []? Met: []? Not Met: []? Adjusted  4. Patient will return to functional activities including being able to stand at least 2 hours without increased symptoms or restriction. []? Progressing: []? Met: []? Not Met: []? Adjusted  5. Pt will be able to squat and lift heavier objects and walk without significant gait deviation    []? Progressing: []? Met: []? Not Met: []? Adjusted            Overall Progression Towards Functional goals/ Treatment Progress Update:  [] Patient is progressing as expected towards functional goals listed. [] Progression is slowed due to complexities/Impairments listed.   [] Progression has been slowed due to co-morbidities. [x] Plan just implemented, too soon to assess goals progression <30days   [] Goals require adjustment due to lack of progress  [] Patient is not progressing as expected and requires additional follow up with physician  [] Other    Persisting Functional Limitations/Impairments:  []Sitting []Standing   [x]Walking []Stairs   [x]Transfers []ADLs   [x]Squatting/bending []Kneeling  [x]Housework []Job related tasks  [x]Driving []Sports/Recreation   []Sleeping []Other:    ASSESSMENT:  Pt tolerated progression of open and closed chain exercises today pretty well. He has decreased endurance in his ankle /calf muscles and exhibits some shaking and decreased control as he does more reps. He is limited in post TC glide and this limits his DF especially closed chain. We added some beginning balance /proprioception activities today. His gait without walking boot was steady and minimally antalgic but he has decreased push off of RLE. He is now fully out of the walking boot. 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 Determine patient tolerance NPV to progressions with WB etc and TB. Advised to use cane for antalgia and with weather conditions not being ideal due to increased weakness in peroneals and general ankle deconditioning/ proprioceptive deficits. Advised ankle ROM 2x/ day and ok to start stationary bike 10 min sessions/ 2x day but use TB 1x/day.  Increase icing amounts  [x] Continue per plan of care [] Alter current plan (see comments)  [] Plan of care initiated [] Hold pending MD visit [] Discharge    Electronically signed by: Araseli Marroquin PT / DPT      Note: If patient does not return for scheduled/ recommended follow up visits, this note will serve as a discharge from care along with most recent update on progress.

## 2022-02-02 ENCOUNTER — HOSPITAL ENCOUNTER (OUTPATIENT)
Dept: PHYSICAL THERAPY | Age: 27
Setting detail: THERAPIES SERIES
Discharge: HOME | End: 2022-02-02
Payer: MEDICAID

## 2022-02-02 PROCEDURE — 97140 MANUAL THERAPY 1/> REGIONS: CPT

## 2022-02-02 PROCEDURE — 97112 NEUROMUSCULAR REEDUCATION: CPT

## 2022-02-02 PROCEDURE — 97110 THERAPEUTIC EXERCISES: CPT

## 2022-02-02 NOTE — FLOWSHEET NOTE
Kurt Chandra  Phone: [phone]   Fax: [phone]    Physical Therapy Treatment Note/ Progress Report:     Date:  2022    Patient Name:  Luis Enrique Chaudhary    :  1995  MRN: [id number]  Restrictions/Precautions:    Medical/Treatment Diagnosis Information:  Diagnosis: A85.024W (ICD-10-CM) - Closed fracture of right ankle DOI 21  Treatment Diagnosis: R ankle pain, decreased ROM/ flexibility, decreased strength, decreased weight bearing, decreased balance and ambulation s/p R talar fracture and immobilization  Insurance/Certification information:  PT Insurance Information: [employer]; all codes billable; 30 PT pcy (needs AUTH FOR F/U SESSIONS)  Physician Information:  Referring Practitioner: Fabby Rizzo MD   (also send notes to ortho doc Radha Valentin, phone #[phone])    Plan of care signed (Y/N): [x]  Yes 22  []  No     Date of Patient follow up with Physician: Dr Bob Chris surgeon  & PCP Fabby Rizzo     Progress Report: []  Yes  [x]  No     Date Range for reporting period:  Beginnin22  Ending:      Progress report due (10 Rx/or 30 days whichever is less):  66 (date)     Recertification due (POC duration/ or 90 days whichever is less): 3/22/22    Visit # Insurance Allowable Auth required? Date Range   4 30 [x]  Yes  []  No pcy       Units approved Units used Date Range   60  9  (as of 22) 22- 22      Latex Allergy:  [x]NO      []YES  Preferred Language for Healthcare:   [x]English       []other:    Functional Scale:        Date assessed:  LEFS: raw score = 39; dysfunction = 51%   22    Pain level:   2/10 R ankle     SUBJECTIVE:  Pt reports his R achilles tendon was sore after therapy and when going down stairs. He did not have any increased swelling. His ankle hurts if he's on it too long, still fatigues.  Hasn't started driving yet    OBJECTIVE:    Pt reports entering clinic in high top sneakers with no AD and mild antalgia. Leaving clinic, patient was little more tenative with more antalgia present, with leaning more on left leg. RESTRICTIONS/PRECAUTIONS: Pt is allowed to be FWB.    MD wanted him to be in a high top shoe by Jan 7,  but pt hasn't been able to get into PT due to insurance issues so pt is still full-time in walking boot    Exercises/Interventions:   Therapeutic Exercise (08969) x 15' Resistance / level Sets/time Reps Notes / Cues    IB stretch standing gastroc  soleus 30\"  30\" 2  1    HR-TR (double leg) standing 2 10    ANKLE ROM  Alphabet  DF/PF   INV/ EV --  --    BAPS board PWB RLE in standing  DF/PF, INV/EV  CW/CCW       R ankle 4 way TB (long sit) lime 2 15 R ea way    Seated EV to INV towel sweeps  3 10 R                                       Therapeutic Activities (51003)        Gait walking FWB, No AD  --                   Neuromuscular Re-ed (04201) x 20'       Tandem stance    Airex   15\"   2 R/L    SLB Level 10\" 3 R    Slow march on Airex     Fwd step up to SLS    Lateral step up to SLS    Fwd step down-R foot on step 4 inch 1 10    Chair squats to 20\"  2 10    Rocker board A/P taps in dls  Side taps in dls 1  1 20 ea way  20 ea way    4\" lat heel taps (R foot on step) 4 inch 2 10                  Manual Intervention (98292) x 15'              PROM & gentle OP R ankle DF/PF, INV/EV 5'   PROM in all planes   Joint mobs Gr III distraction and post talar glide; metatarsal glides and subtalar glide to inc  EV/INV  5'  supine   STM  IASTM  Edge tool to R Achilles and peroneals 5'                       Modalities:     Pt. Education:  1/12/22: [facility] pt he can start driving once out of the boot and if he is no longer on narcotic pain meds  -pt educated on diagnosis, prognosis and expectations for rehab  -all pt questions were answered    Home Exercise Program:  Patient was instructed in the following exercises, performed them in the clinic and was issued a handout:     Access Code: [email or web address]  URL: [email or web address]  Date: 01/12/2022  Prepared by: Monica Alarcon  Exercises  Seated Calf Towel Stretch - 2 x daily - 7 x weekly - 5 reps - 20-30 seconds hold  Seated Heel Raise - 1-2 x daily - 5-7 x weekly - 2-3 sets - 10 reps  Seated Heel Toe Raises - 1-2 x daily - 5-7 x weekly - 2-3 sets - 10 reps  Seated Knee Flexion Stretch - 1-2 x daily - 5-7 x weekly - 10 reps - 10 seconds hold  Standing Weight Shift - 1-2 x daily - 5-7 x weekly - 2-3 sets - 10 reps  Standing Weight Shifting Forward and Backward (in stagger stance) - 1-2 x daily - 7 x weekly - 2-3 sets - 10 reps       Therapeutic Exercise and NMR EXR  [x] (60653) Provided verbal/tactile cueing for activities related to strengthening, flexibility, endurance, ROM for improvements in LE, proximal hip, and core control with self care, mobility, lifting, ambulation. [x] (92773) Provided verbal/tactile cueing for activities related to improving balance, coordination, kinesthetic sense, posture, motor skill, proprioception  to assist with LE, proximal hip, and core control in self care, mobility, lifting, ambulation and eccentric single leg control.   [] (52625) Therapist is in constant attendance of 2 or more patients providing skilled therapy interventions, but not providing any significant amount of measurable one-on-one time to either patient, for improvements in LE, proximal hip, and core control in self care, mobility, lifting, ambulation and eccentric single leg control.      NMR and Therapeutic Activities:    [] (75739 or 82165) Provided verbal/tactile cueing for activities related to improving balance, coordination, kinesthetic sense, posture, motor skill, proprioception and motor activation to allow for proper function of core, proximal hip and LE with self care and ADLs  [] (11679) Gait Re-education- Provided training and instruction to the patient for proper LE, core and proximal hip recruitment and (06578)    GOALS:   Patient stated goal: regain strength in order to return to work  []? Progressing: []? Met: []? Not Met: []? Adjusted     Therapist goals for Patient:   Short Term Goals: To be achieved in: 2 weeks  1. Independent in HEP and progression per patient tolerance, in order to prevent re-injury. []? Progressing: []? Met: []? Not Met: []? Adjusted  2. Patient will have a decrease in pain of R ankle and decreased fear of wt bearing so he can walk in a normal gym shoe to facilitate improvement in movement, function, and ADLs as indicated by Functional Deficits. []? Progressing: []? Met: []? Not Met: []? Adjusted     Long Term Goals: To be achieved in: 6-8 weeks  1. Disability index score of 20% or less for the LEFS to assist with reaching prior level of function. []? Progressing: []? Met: []? Not Met: []? Adjusted  2. Patient will demonstrate increased AROM of R ankle DF and PF by at least 10 deg to allow for proper joint functioning for correct heel strike and push off during gait  []? Progressing: []? Met: []? Not Met: []? Adjusted  3. Patient will demonstrate an increase in Strength to at least 4+/5 in R ankle as well as good proximal hip strength and control to allow ability to ascend/descend stairs and ladders without difficulty  []? Progressing: []? Met: []? Not Met: []? Adjusted  4. Patient will return to functional activities including being able to stand at least 2 hours without increased symptoms or restriction. []? Progressing: []? Met: []? Not Met: []? Adjusted  5. Pt will be able to squat and lift heavier objects and walk without significant gait deviation    []? Progressing: []? Met: []? Not Met: []? Adjusted            Overall Progression Towards Functional goals/ Treatment Progress Update:  [] Patient is progressing as expected towards functional goals listed. [] Progression is slowed due to complexities/Impairments listed.   [] Progression has been slowed due to co-morbidities. [x] Plan just implemented, too soon to assess goals progression <30days   [] Goals require adjustment due to lack of progress  [] Patient is not progressing as expected and requires additional follow up with physician  [] Other    Persisting Functional Limitations/Impairments:  []Sitting []Standing   [x]Walking []Stairs   [x]Transfers []ADLs   [x]Squatting/bending []Kneeling  [x]Housework []Job related tasks  [x]Driving []Sports/Recreation   []Sleeping []Other:    ASSESSMENT:   Pt was able to progress to doing squats to a 20 inch ht chair. We also worked on closed chain mobility for going down stairs. Continued strengthening and early balance activities and added IASTM for Achilles. Pt sore after session but not in high levels of pain. Calf fatigues easily. 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 Determine patient tolerance NPV to progressions with WB etc and TB. Advised to use cane for antalgia and with weather conditions not being ideal due to increased weakness in peroneals and general ankle deconditioning/ proprioceptive deficits. Advised ankle ROM 2x/ day and ok to start stationary bike 10 min sessions/ 2x day but use TB 1x/day. Increase icing amounts  [x] Continue per plan of care [] Alter current plan (see comments)  [] Plan of care initiated [] Hold pending MD visit [] Discharge    Electronically signed by:  Ryan Treviño PT / DPT      Note: If patient does not return for scheduled/ recommended follow up visits, this note will serve as a discharge from care along with most recent update on progress.

## 2022-02-07 ENCOUNTER — HOSPITAL ENCOUNTER (OUTPATIENT)
Dept: PHYSICAL THERAPY | Age: 27
Setting detail: THERAPIES SERIES
Discharge: HOME | End: 2022-02-07
Payer: MEDICAID

## 2022-02-07 PROCEDURE — 97110 THERAPEUTIC EXERCISES: CPT

## 2022-02-07 PROCEDURE — 97112 NEUROMUSCULAR REEDUCATION: CPT

## 2022-02-07 PROCEDURE — 97140 MANUAL THERAPY 1/> REGIONS: CPT

## 2022-02-07 NOTE — FLOWSHEET NOTE
[address] [facility]  Phone: [phone]   Fax: [phone]    Physical Therapy Treatment Note/ Progress Report:     Date:  2022    Patient Name:  Sydnee Iyer    :  1995  MRN: [id number]  Restrictions/Precautions:    Medical/Treatment Diagnosis Information:  Diagnosis: J38.097H (ICD-10-CM) - Closed fracture of right ankle DOI 21  Treatment Diagnosis: R ankle pain, decreased ROM/ flexibility, decreased strength, decreased weight bearing, decreased balance and ambulation s/p R talar fracture and immobilization  Insurance/Certification information:  PT Insurance Information: [employer]; all codes billable; 30 PT pcy (needs AUTH FOR F/U SESSIONS)  Physician Information:  Referring Practitioner: Orlando Capone MD   (also send notes to ortho doc Lizzette Rick, phone #[phone])    Plan of care signed (Y/N): [x]  Yes 22  []  No     Date of Patient follow up with Physician: Dr Sajnana Garner surgeon  & PCP Orlando Capone     Progress Report: []  Yes  [x]  No     Date Range for reporting period:  Beginnin22  Ending:      Progress report due (10 Rx/or 30 days whichever is less):   (date)     Recertification due (POC duration/ or 90 days whichever is less): 3/22/22    Visit # Insurance Allowable Auth required? Date Range   5 30 [x]  Yes  []  No pcy       Units approved Units used Date Range   60  912  (as of 22) 22- 22      Latex Allergy:  [x]NO      []YES  Preferred Language for Healthcare:   [x]English       []other:    Functional Scale:        Date assessed:  LEFS: raw score = 39; dysfunction = 51%   22    Pain level:   0/10 R ankle     SUBJECTIVE:  Pt reports not noticing any pain today. Pt ambulating without antalgia into clinic. He thinks his standing tolerance is about 30 minutes. Hasn't tried driving yet due to the bad weather.  Pt is wondering about when he can go back to work (he has to stand to do his job for 8 hr shifts). OBJECTIVE:   1/28 Pt reports entering clinic in high top sneakers with no AD and mild antalgia. Leaving clinic, patient was little more tenative with more antalgia present, with leaning more on left leg. RESTRICTIONS/PRECAUTIONS: Pt is allowed to be FWB.    MD wanted him to be in a high top shoe by Jan 7,  but pt hasn't been able to get into PT due to insurance issues so pt is still full-time in walking boot    Exercises/Interventions:   Therapeutic Exercise (13785) x 15' Resistance / level Sets/time Reps Notes / Cues    IB stretch standing gastroc  soleus 30\"  30\" 2  1    HR-TR (double leg) standing 2 10    Ecc R HR & lower  1/3\" 5 R Fatigues easily   ANKLE ROM  Alphabet  DF/PF   INV/ EV --  --    BAPS board PWB RLE in standing  DF/PF, INV/EV  CW/CCW       R ankle 4 way TB (long sit) lime 2 15 R ea way    Seated EV to INV towel sweeps     Ripton board PWB  R-ankle circles 1 15 CW/CCW                                Therapeutic Activities (88151)        Gait walking FWB, No AD  --                   Neuromuscular Re-ed (64391) x 15'       Tandem stance    Airex   15\"   2 R/L    SLB Airex 10\" 3 R    Slow march on Airex     Fwd step up to SLS    Lateral step up to SLS    Fwd step down-R foot on step    Chair squats to 20\"  2 10    Rocker board A/P taps in dls  Side taps in dls 1  1 20 ea way  20 ea way           4\" lat heel taps (R foot on step)    4\" fwd step up & over (R foot on step) 4 in 1/3\" 12           Manual Intervention (47712) x 15'              PROM & gentle OP R ankle DF/PF, INV/EV 5'   PROM in all planes   Joint mobs Gr III-IV distraction and post talar glide; metatarsal glides and subtalar glide to inc  EV/INV  6'   OKC and then in CKC with standing squats   STM   STM to R Achilles and peroneals 5'                       Modalities:     Pt. Education:  2/2/22: Tho Sicks pt to try to build up his standing tolerance work on at least an hour at a time if tolerated. 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 and if he is no longer on narcotic pain meds  -pt educated on diagnosis, prognosis and expectations for rehab  -all pt questions were answered    Home Exercise Program:  Patient was instructed in the following exercises, performed them in the clinic and was issued a handout:     Access Code: [email or web address]  URL: [email or web address]/  Date: 01/12/2022  Prepared by: Monica Alarcon  Exercises  Seated Calf Towel Stretch - 2 x daily - 7 x weekly - 5 reps - 20-30 seconds hold  Seated Heel Raise - 1-2 x daily - 5-7 x weekly - 2-3 sets - 10 reps  Seated Heel Toe Raises - 1-2 x daily - 5-7 x weekly - 2-3 sets - 10 reps  Seated Knee Flexion Stretch - 1-2 x daily - 5-7 x weekly - 10 reps - 10 seconds hold  Standing Weight Shift - 1-2 x daily - 5-7 x weekly - 2-3 sets - 10 reps  Standing Weight Shifting Forward and Backward (in stagger stance) - 1-2 x daily - 7 x weekly - 2-3 sets - 10 reps    Date: 2/7/22: Instructed pt to progress to standing B heel raises and eccentric lowering from R-HR       Therapeutic Exercise and NMR EXR  [x] (78445) Provided verbal/tactile cueing for activities related to strengthening, flexibility, endurance, ROM for improvements in LE, proximal hip, and core control with self care, mobility, lifting, ambulation. [x] (96982) Provided verbal/tactile cueing for activities related to improving balance, coordination, kinesthetic sense, posture, motor skill, proprioception  to assist with LE, proximal hip, and core control in self care, mobility, lifting, ambulation and eccentric single leg control.   [] (43057) Therapist is in constant attendance of 2 or more patients providing skilled therapy interventions, but not providing any significant amount of measurable one-on-one time to either patient, for improvements in LE, proximal hip, and core control in self care, mobility, lifting, ambulation and eccentric single leg control. NMR and Therapeutic Activities:    [] (53966 or 79474) Provided verbal/tactile cueing for activities related to improving balance, coordination, kinesthetic sense, posture, motor skill, proprioception and motor activation to allow for proper function of core, proximal hip and LE with self care and ADLs  [] (92035) Gait Re-education- Provided training and instruction to the patient for proper LE, core and proximal hip recruitment and positioning and eccentric body weight control with ambulation re-education including up and down stairs     Home Exercise Program:    [] (45517) Reviewed/Progressed HEP activities related to strengthening, flexibility, endurance, ROM of core, proximal hip and LE for functional self-care, mobility, lifting and ambulation/stair navigation   [] (32208)Reviewed/Progressed HEP activities related to improving balance, coordination, kinesthetic sense, posture, motor skill, proprioception of core, proximal hip and LE for self care, mobility, lifting, and ambulation/stair navigation      Manual Treatments:  PROM / STM / Oscillations-Mobs:  G-I, II, III, IV (PA's, Inf., Post.)  [x] (92413) Provided manual therapy to mobilize LE, proximal hip and/or LS spine soft tissue/joints for the purpose of modulating pain, promoting relaxation,  increasing ROM, reducing/eliminating soft tissue swelling/inflammation/restriction, improving soft tissue extensibility and allowing for proper ROM for normal function with self care, mobility, lifting and ambulation. Modalities:  [] (62824) Vasopneumatic compression: Utilized vasopneumatic compression to decrease edema / swelling for the purpose of improving mobility and quad tone / recruitment which will allow for increased overall function including but not limited to self-care, transfers, ambulation, and ascending / descending stairs.     declined  Ice 1/28    Charges:  Timed Code Treatment Minutes: 48   Total Treatment Minutes: 48     [] EVAL - LOW (51972) [] EVAL - MOD (03560)  [] EVAL - HIGH (22746)  [] RE-EVAL (78815)  [x] BB(76764) x  1     [] Ionto  [x] NMR (04233) x 1      [] Vaso  [x] Manual (13709) x  1     [] Ultrasound  [] TA x        [] Mech Traction (91018)  [] Aquatic Therapy x      [] ES (un) (26586):   [] Home Management Training x  [] ES(attended) (56156)   [] Dry Needling 1-2 muscles (45448):  [] Dry Needling 3+ muscles (450247  [] Group:      [] Other: GAIT TRAINING (77915)    GOALS:   Patient stated goal: regain strength in order to return to work  []? Progressing: []? Met: []? Not Met: []? Adjusted     Therapist goals for Patient:   Short Term Goals: To be achieved in: 2 weeks  1. Independent in HEP and progression per patient tolerance, in order to prevent re-injury. []? Progressing: []? Met: []? Not Met: []? Adjusted  2. Patient will have a decrease in pain of R ankle and decreased fear of wt bearing so he can walk in a normal gym shoe to facilitate improvement in movement, function, and ADLs as indicated by Functional Deficits. []? Progressing: []? Met: []? Not Met: []? Adjusted     Long Term Goals: To be achieved in: 6-8 weeks  1. Disability index score of 20% or less for the LEFS to assist with reaching prior level of function. []? Progressing: []? Met: []? Not Met: []? Adjusted  2. Patient will demonstrate increased AROM of R ankle DF and PF by at least 10 deg to allow for proper joint functioning for correct heel strike and push off during gait  []? Progressing: []? Met: []? Not Met: []? Adjusted  3. Patient will demonstrate an increase in Strength to at least 4+/5 in R ankle as well as good proximal hip strength and control to allow ability to ascend/descend stairs and ladders without difficulty  []? Progressing: []? Met: []? Not Met: []? Adjusted  4. Patient will return to functional activities including being able to stand at least 2 hours without increased symptoms or restriction. []? Progressing: []? Met: []?  Not Met: []? Adjusted  5. Pt will be able to squat and lift heavier objects and walk without significant gait deviation    []? Progressing: []? Met: []? Not Met: []? Adjusted            Overall Progression Towards Functional goals/ Treatment Progress Update:  [] Patient is progressing as expected towards functional goals listed. [] Progression is slowed due to complexities/Impairments listed. [] Progression has been slowed due to co-morbidities. [x] Plan just implemented, too soon to assess goals progression <30days   [] Goals require adjustment due to lack of progress  [] Patient is not progressing as expected and requires additional follow up with physician  [] Other    Persisting Functional Limitations/Impairments:  []Sitting []Standing   [x]Walking []Stairs   [x]Transfers []ADLs   [x]Squatting/bending []Kneeling  [x]Housework []Job related tasks  [x]Driving []Sports/Recreation   []Sleeping []Other:    ASSESSMENT:   Pt was walking better with some improvement in heel strike & push off. He was able to balance in single leg on Airex today. He still has some stiffness in closed chain R ankle DF and some Achilles tightness. We started working on progressing to single leg eccentric HR. Pt tolerated treatment well today. Continue PT    Treatment/Activity Tolerance:  [x] Pt able to complete treatment [] Patient limited by fatique  [] Patient limited by pain  [] Patient limited by other medical complications  [] Other:     Prognosis:    [x] Good [] Fair  [] Poor    Patient Requires Follow-up: [x] Yes  [] No    Return to Play:    [x]  N/A      PLAN: PT 2x / week for 6-8 weeks. Determine patient tolerance NPV to progressions with WB etc and TB. [x] Continue per plan of care [] Alter current plan (see comments)  [] Plan of care initiated [] Hold pending MD visit [] Discharge    Electronically signed by:  Samantha Ro, PT / DPT      Note: If patient does not return for scheduled/ recommended follow up visits, this note will serve as a discharge from care along with most recent update on progress.

## 2022-02-09 ENCOUNTER — HOSPITAL ENCOUNTER (OUTPATIENT)
Dept: PHYSICAL THERAPY | Age: 27
Setting detail: THERAPIES SERIES
Discharge: HOME | End: 2022-02-09
Payer: MEDICAID

## 2022-02-09 PROCEDURE — 97140 MANUAL THERAPY 1/> REGIONS: CPT

## 2022-02-09 PROCEDURE — 97112 NEUROMUSCULAR REEDUCATION: CPT

## 2022-02-09 PROCEDURE — 97110 THERAPEUTIC EXERCISES: CPT

## 2022-02-09 NOTE — FLOWSHEET NOTE
[address] [facility]  Phone: [phone]   Fax: [phone]    Physical Therapy Treatment Note/ Progress Report:     Date:  2022    Patient Name:  Sydnee Iyer    :  1995  MRN: [id number]  Restrictions/Precautions:    Medical/Treatment Diagnosis Information:  Diagnosis: Q56.833H (ICD-10-CM) - Closed fracture of right ankle DOI 21  Treatment Diagnosis: R ankle pain, decreased ROM/ flexibility, decreased strength, decreased weight bearing, decreased balance and ambulation s/p R talar fracture and immobilization  Insurance/Certification information:  PT Insurance Information: [email or web address]; all codes billable; 30 PT pcy (needs AUTH FOR F/U SESSIONS)  Physician Information:  Referring Practitioner: Orlando Capone MD   (also send notes to ortho doc Lizzette Cabrera, phone #[phone])    Plan of care signed (Y/N): [x]  Yes 22  []  No     Date of Patient follow up with Physician: Dr Sanjana Garner surgeon  & PCP Orlando Capone     Progress Report: []  Yes  [x]  No     Date Range for reporting period:  Beginnin22  Ending:      Progress report due (10 Rx/or 30 days whichever is less):   (date)     Recertification due (POC duration/ or 90 days whichever is less): 3/22/22    Visit # Insurance Allowable Auth required? Date Range   6 30 [x]  Yes  []  No pcy       Units approved Units used Date Range   60     15 (as of 22) 22- 22      Latex Allergy:  [x]NO      []YES  Preferred Language for Healthcare:   [x]English       []other:    Functional Scale:        Date assessed:  LEFS: raw score = 39; dysfunction = 51%   22    Pain level:   2/10 R ankle     SUBJECTIVE:  Pt reports some mild pain in his R Achilles tendon. R ankle swelled up and was discolored Monday night. It resolved after resting and elevating leg    OBJECTIVE:    Pt reports entering clinic in high top sneakers with no AD and mild antalgia. Leaving clinic, patient was little more tenative with more antalgia present, with leaning more on left leg. RESTRICTIONS/PRECAUTIONS: Pt is allowed to be FWB. MD wanted him to be in a high top shoe by Jan 7,  but pt hasn't been able to get into PT due to insurance issues so pt is still full-time in walking boot    Exercises/Interventions:   Therapeutic Exercise (00843) x 12' Resistance / level Sets/time Reps Notes / Cues    IB stretch standing gastroc  soleus 30\"  30\" 2  1    HR-TR (double leg) standing 2 10    Ecc R HR & lower  1/3\" 5 R Fatigues easily   ANKLE ROM  Alphabet  DF/PF   INV/ EV --  --    BAPS board PWB RLE in standing  DF/PF, INV/EV  CW/CCW       R ankle 4 way TB (long sit)    Seated EV to INV towel sweeps     [place] board PWB  R-ankle circles 1 15 CW/CCW    Manual isometrics R toe flexors  2/5\" 6                          Therapeutic Activities (10368)        Gait walking FWB, No AD  --                   Neuromuscular Re-ed (65075) x 13'       Tandem stance    Airex   15\"   2 R/L    SLB Airex 10\" 3 R    Slow march on Airex     Fwd step up to SLS    Lateral step up to SLS    Fwd step down-R foot on step    Chair squats to 20\"  2 10    Rocker board A/P taps in dls  Side taps dls  A/P taps sls 1  1  1 20 ea way  20 ea way  10 R 2/9 added sls   Steamboats in R sls on 2\" step  2 10   3 ways 2/9 added   4\" lat heel taps (R foot on step)    4\" fwd step up & over (R foot on step) 4 in 1/3\" 12           Manual Intervention (50001) x 18'              PROM & gentle OP R ankle DF/PF, INV/EV, toe flex/ext 5'   PROM in all planes   Joint mobs Gr III-IV distraction and post talar glide; metatarsal glides and subtalar glide to inc  EV/INV  6'   OKC and then in CKC with standing squats   STM IASTM  IASTM w/ HG 7 to R Achilles, peroneals  7'                       Modalities:     Pt. Education:  2/2/22: Bryan Bunn pt to try to build up his standing tolerance work on at least an hour at a time if tolerated.   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 and if he is no longer on narcotic pain meds  -pt educated on diagnosis, prognosis and expectations for rehab  -all pt questions were answered    Home Exercise Program:  Patient was instructed in the following exercises, performed them in the clinic and was issued a handout:     Access Code: [email or web address]  URL: [email or web address]  Date: 01/12/2022  Prepared by: Monica Alarcon  Exercises  Seated Calf Towel Stretch - 2 x daily - 7 x weekly - 5 reps - 20-30 seconds hold  Seated Heel Raise - 1-2 x daily - 5-7 x weekly - 2-3 sets - 10 reps  Seated Heel Toe Raises - 1-2 x daily - 5-7 x weekly - 2-3 sets - 10 reps  Seated Knee Flexion Stretch - 1-2 x daily - 5-7 x weekly - 10 reps - 10 seconds hold  Standing Weight Shift - 1-2 x daily - 5-7 x weekly - 2-3 sets - 10 reps  Standing Weight Shifting Forward and Backward (in stagger stance) - 1-2 x daily - 7 x weekly - 2-3 sets - 10 reps    Date: 2/7/22: Instructed pt to progress to standing B heel raises and eccentric lowering from R-HR    Date: 2/9/22: Instructed to add active toe flex /ext and toe scrunches with towel       Therapeutic Exercise and NMR EXR  [x] (12114) Provided verbal/tactile cueing for activities related to strengthening, flexibility, endurance, ROM for improvements in LE, proximal hip, and core control with self care, mobility, lifting, ambulation.   [x] (89929) Provided verbal/tactile cueing for activities related to improving balance, coordination, kinesthetic sense, posture, motor skill, proprioception  to assist with LE, proximal hip, and core control in self care, mobility, lifting, ambulation and eccentric single leg control.   [] (68266) Therapist is in constant attendance of 2 or more patients providing skilled therapy interventions, but not providing any significant amount of measurable one-on-one time to either patient, for improvements in LE, proximal hip, and core control in self care, mobility, lifting, ambulation and eccentric single leg control. NMR and Therapeutic Activities:    [] (52416 or 16112) Provided verbal/tactile cueing for activities related to improving balance, coordination, kinesthetic sense, posture, motor skill, proprioception and motor activation to allow for proper function of core, proximal hip and LE with self care and ADLs  [] (50254) Gait Re-education- Provided training and instruction to the patient for proper LE, core and proximal hip recruitment and positioning and eccentric body weight control with ambulation re-education including up and down stairs     Home Exercise Program:    [x] (39675) Reviewed/Progressed HEP activities related to strengthening, flexibility, endurance, ROM of core, proximal hip and LE for functional self-care, mobility, lifting and ambulation/stair navigation   [] (17994)Reviewed/Progressed HEP activities related to improving balance, coordination, kinesthetic sense, posture, motor skill, proprioception of core, proximal hip and LE for self care, mobility, lifting, and ambulation/stair navigation      Manual Treatments:  PROM / STM / Oscillations-Mobs:  G-I, II, III, IV (PA's, Inf., Post.)  [x] (56979) Provided manual therapy to mobilize LE, proximal hip and/or LS spine soft tissue/joints for the purpose of modulating pain, promoting relaxation,  increasing ROM, reducing/eliminating soft tissue swelling/inflammation/restriction, improving soft tissue extensibility and allowing for proper ROM for normal function with self care, mobility, lifting and ambulation. Modalities:  [] (97107) Vasopneumatic compression: Utilized vasopneumatic compression to decrease edema / swelling for the purpose of improving mobility and quad tone / recruitment which will allow for increased overall function including but not limited to self-care, transfers, ambulation, and ascending / descending stairs.        Charges:  Timed Code Treatment Minutes: 45   Total Treatment Minutes: 45     [] EVAL - LOW (33836)   [] EVAL - MOD (96890)  [] EVAL - HIGH (34895)  [] RE-EVAL (80765)  [x] QG(19488) x  1     [] Ionto  [x] NMR (15117) x 1      [] Vaso  [x] Manual (68015) x  1     [] Ultrasound  [] TA x        [] Mech Traction (99251)  [] Aquatic Therapy x      [] ES (un) (35772):   [] Home Management Training x  [] ES(attended) (73550)   [] Dry Needling 1-2 muscles (25338):  [] Dry Needling 3+ muscles (141168  [] Group:      [] Other: GAIT TRAINING (42002)    GOALS:   Patient stated goal: regain strength in order to return to work  []? Progressing: []? Met: []? Not Met: []? Adjusted     Therapist goals for Patient:   Short Term Goals: To be achieved in: 2 weeks  1. Independent in HEP and progression per patient tolerance, in order to prevent re-injury. []? Progressing: []? Met: []? Not Met: []? Adjusted  2. Patient will have a decrease in pain of R ankle and decreased fear of wt bearing so he can walk in a normal gym shoe to facilitate improvement in movement, function, and ADLs as indicated by Functional Deficits. []? Progressing: []? Met: []? Not Met: []? Adjusted     Long Term Goals: To be achieved in: 6-8 weeks  1. Disability index score of 20% or less for the LEFS to assist with reaching prior level of function. []? Progressing: []? Met: []? Not Met: []? Adjusted  2. Patient will demonstrate increased AROM of R ankle DF and PF by at least 10 deg to allow for proper joint functioning for correct heel strike and push off during gait  []? Progressing: []? Met: []? Not Met: []? Adjusted  3. Patient will demonstrate an increase in Strength to at least 4+/5 in R ankle as well as good proximal hip strength and control to allow ability to ascend/descend stairs and ladders without difficulty  []? Progressing: []? Met: []? Not Met: []? Adjusted  4. Patient will return to functional activities including being able to stand at least 2 hours without increased symptoms or restriction. []? Progressing: []? Met: []? Not Met: []? Adjusted  5. Pt will be able to squat and lift heavier objects and walk without significant gait deviation on level and unlevel surfaces   []? Progressing: []? Met: []? Not Met: []? Adjusted            Overall Progression Towards Functional goals/ Treatment Progress Update:  [] Patient is progressing as expected towards functional goals listed. [] Progression is slowed due to complexities/Impairments listed. [] Progression has been slowed due to co-morbidities. [x] Plan just implemented, too soon to assess goals progression <30days   [] Goals require adjustment due to lack of progress  [] Patient is not progressing as expected and requires additional follow up with physician  [] Other    Persisting Functional Limitations/Impairments:  []Sitting []Standing   [x]Walking []Stairs   [x]Transfers []ADLs   [x]Squatting/bending []Kneeling  [x]Housework []Job related tasks  [x]Driving []Sports/Recreation   []Sleeping []Other:    ASSESSMENT:   Pt exhibited mild antalgia walking into PT today and c/o some pain in R Achilles/post ankle. He reports he noticed some swelling and discoloration of his R foot /ankle after a long time in the car on Monday. We added some single leg activities today. His R calf muscles and toe flexors fatigue quickly. We tried some IASTM and he had some sig fascial restrictions in the Achilles and calf and peroneal tendons. Pt still needs further strengthening before returning to his job. Continue PT. Treatment/Activity Tolerance:  [x] Pt able to complete treatment [] Patient limited by fatique  [] Patient limited by pain  [] Patient limited by other medical complications  [] Other:     Prognosis:    [x] Good [] Fair  [] Poor    Patient Requires Follow-up: [x] Yes  [] No    Return to Play:    [x]  N/A      PLAN: PT 2x / week for 6-8 weeks.     [x] Continue per plan of care [] Alter current plan (see comments)  [] Plan of care initiated [] Hold pending MD visit [] Discharge    Electronically signed by: Andres Johnson, PT / DPT      Note: If patient does not return for scheduled/ recommended follow up visits, this note will serve as a discharge from care along with most recent update on progress.

## 2022-02-14 ENCOUNTER — HOSPITAL ENCOUNTER (OUTPATIENT)
Dept: PHYSICAL THERAPY | Age: 27
Setting detail: THERAPIES SERIES
Discharge: HOME | End: 2022-02-14
Payer: MEDICAID

## 2022-02-14 PROCEDURE — 97140 MANUAL THERAPY 1/> REGIONS: CPT

## 2022-02-14 PROCEDURE — 97112 NEUROMUSCULAR REEDUCATION: CPT

## 2022-02-14 PROCEDURE — 97110 THERAPEUTIC EXERCISES: CPT

## 2022-02-14 PROCEDURE — 97530 THERAPEUTIC ACTIVITIES: CPT

## 2022-02-14 NOTE — PROGRESS NOTES
[address] [facility]  Phone: [phone]   Fax: [phone]    Physical Therapy Treatment Note/ Progress Report:     Date:  2022    Patient Name:  Sydnee Iyer    :  1995  MRN: [id number]  Restrictions/Precautions:    Medical/Treatment Diagnosis Information:  Diagnosis: T69.743K (ICD-10-CM) - Closed fracture of right ankle DOI 21  Treatment Diagnosis: R ankle pain, decreased ROM/ flexibility, decreased strength, decreased weight bearing, decreased balance and ambulation s/p R talar fracture and immobilization  Insurance/Certification information:  PT Insurance Information: [employer]; all codes billable; 30 PT pcy   (needs AUTH FOR F/U SESSIONS)  Physician Information:  Referring Practitioner: Orlando Capone MD   (also send notes to ortho doc Lizzette Cabrera, phone #[phone])    Plan of care signed (Y/N): [x]  Yes 22  []  No     Date of Patient follow up with Physician: Dr Sanjana Garner surgeon  & PCP Orlando Capone     Progress Report: [x]  Yes  []  No     Date Range for reporting period:  Beginnin22  Endin22    Progress report due (10 Rx/or 30 days whichever is less):       Recertification due (POC duration/ or 90 days whichever is less): 3/22/22     Visit # Insurance Allowable Auth required? Date Range   7 30 [x]  Yes  []  No pcy       Units approved Units used Date Range   60     23 (as of 22) 22- 22      Latex Allergy:  [x]NO      []YES  Preferred Language for Healthcare:   [x]English       []other:    Functional Scale:        Date assessed:  LEFS: raw score = 39; dysfunction = 51%   22  LEFS: raw score= 47; dysfunction=41%   22    Pain level:   0/10 R ankle     SUBJECTIVE:  Pt reports he tried driving for the first time since his injury and it went fine. He's working on increasing standing tolerance.  He thinks he can do about an hour before needing to rest. He states that he gets pain when he puts full WBng on the right foot in single leg balance (pain is in the medial calcaneus and Achilles). OBJECTIVE:   1/28 Pt reports entering clinic in high top sneakers with no AD and mild antalgia.  Leaving clinic, patient was little more tenative with more antalgia present, with leaning more on left leg.  2/14:  2/14/22 ROM STRENGTH     MOTION- R ankle LEFT RIGHT LEFT RIGHT Comments   DF  5 knee straight  10 knee bent  4+    PF  46  3+    INV  28  4+    EV  16  4+    Post tib     4-    Single leg balance    5-10  sec Some difficulty maintaining longitudinal arch       RESTRICTIONS/PRECAUTIONS: Pt is allowed to be FWB as of 1/7      Exercises/Interventions:   Therapeutic Exercise (71567) x 15' Resistance / level Sets/time Reps Notes / Cues    IB stretch standing gastroc  soleus 30\"  30\" 2  1    HR-TR (double leg) standing 2 10    Ecc R HR & lower  1/3\" 5 R Fatigues easily   ANKLE ROM  Alphabet  DF/PF   INV/ EV --  --    BAPS board PWB RLE in standing  DF/PF, INV/EV  CW/CCW       R ankle 4 way TB (long sit)    Seated EV to INV towel sweeps     [place] board Lunge  R-ankle circles 1 15 CW/CCW    Manual isometrics R toe flexors  2/5\" 6     Leg press calf press DL 70#`  SL 30# 1  3 10  5 R                                Therapeutic Activities (07154)        Gait walking FWB, No AD  --     SEE OBJ MEASURES & PATIENT ED  12'  2/14/22                 Neuromuscular Re-ed (56877) x 15'       Tandem stance    Airex   15\"   2 R/L    SLB [place] on level ground   10\" 3 R 2/14 working on doming of arch   Slow march on Airex     Fwd step up to SLS    Lateral step up to SLS    Fwd step down-R foot on step    Chair squats to 20\"  2 10    Rocker board A/P taps in dls  Side taps dls  A/P taps sls 1  1  1 20 ea way  20 ea way  15 R No UE assist for DLS   Steamboats in R sls on 2\" step  1 10   3 ways 2/9 added   4\" lat heel taps (R foot on step)    4\" fwd step up & over (R foot on step)    TRX squats to chair DL to 18\"  SL to 23\" 1  2 10  6 Unable to do fwb R-sls so had L heel on floor                 Manual Intervention (65790) x 12'              PROM & gentle OP R ankle DF/PF, INV/EV, toe flex/ext 6'   PROM in all planes   Joint mobs Gr III-IV distraction and post talar glide; metatarsal glides and subtalar glide to inc  EV/INV  6'   OKC and then in CKC with standing squats   STM IASTM                       Modalities:     Pt. Education:  2/14/22: Val Gibson pt for prog note. Reissued LEFS. Instructed pt to keep working on increasing his walking/standing tolerance. Instructed pt to work on single leg balance activities in [place] now and work on maintaining arch in stance. Discussed continuing PT another 3 wks and then hopefully pt will be ok for RTW.  2/2/22: Advised pt to try to build up his standing tolerance work on at least an hour at a time if tolerated. 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 and if he is no longer on narcotic pain meds  -pt educated on diagnosis, prognosis and expectations for rehab  -all pt questions were answered    Home Exercise Program:  Patient was instructed in the following exercises, performed them in the clinic and was issued a handout:     Access Code: [email or web address]  URL: [email or web address]  Date: 01/12/2022  Prepared by: Monica Alarcon  Exercises  Seated Calf Towel Stretch - 2 x daily - 7 x weekly - 5 reps - 20-30 seconds hold  Seated Heel Raise - 1-2 x daily - 5-7 x weekly - 2-3 sets - 10 reps  Seated Heel Toe Raises - 1-2 x daily - 5-7 x weekly - 2-3 sets - 10 reps  Seated Knee Flexion Stretch - 1-2 x daily - 5-7 x weekly - 10 reps - 10 seconds hold  Standing Weight Shift - 1-2 x daily - 5-7 x weekly - 2-3 sets - 10 reps  Standing Weight Shifting Forward and Backward (in stagger stance) - 1-2 x daily - 7 x weekly - 2-3 sets - 10 reps    Date: 2/7/22:  Instructed pt to progress to standing B heel raises and eccentric lowering from R-HR    Date: 2/9/22: Instructed to add active toe flex /ext and toe scrunches with towel    Date: 2/14/22: Instructed pt to add standing runners calf stretch       Therapeutic Exercise and NMR EXR  [x] (83456) Provided verbal/tactile cueing for activities related to strengthening, flexibility, endurance, ROM for improvements in LE, proximal hip, and core control with self care, mobility, lifting, ambulation. [x] (62306) Provided verbal/tactile cueing for activities related to improving balance, coordination, kinesthetic sense, posture, motor skill, proprioception  to assist with LE, proximal hip, and core control in self care, mobility, lifting, ambulation and eccentric single leg control.   [] (78471) Therapist is in constant attendance of 2 or more patients providing skilled therapy interventions, but not providing any significant amount of measurable one-on-one time to either patient, for improvements in LE, proximal hip, and core control in self care, mobility, lifting, ambulation and eccentric single leg control.      NMR and Therapeutic Activities:    [] (67664 or 98691) Provided verbal/tactile cueing for activities related to improving balance, coordination, kinesthetic sense, posture, motor skill, proprioception and motor activation to allow for proper function of core, proximal hip and LE with self care and ADLs  [] (23070) Gait Re-education- Provided training and instruction to the patient for proper LE, core and proximal hip recruitment and positioning and eccentric body weight control with ambulation re-education including up and down stairs     Home Exercise Program:    [x] (45478) Reviewed/Progressed HEP activities related to strengthening, flexibility, endurance, ROM of core, proximal hip and LE for functional self-care, mobility, lifting and ambulation/stair navigation   [] (25723)Reviewed/Progressed HEP activities related to improving balance, coordination, kinesthetic sense, posture, motor skill, proprioception of core, proximal hip and LE for self care, mobility, lifting, and ambulation/stair navigation      Manual Treatments:  PROM / STM / Oscillations-Mobs:  G-I, II, III, IV (PA's, Inf., Post.)  [x] (02774) Provided manual therapy to mobilize LE, proximal hip and/or LS spine soft tissue/joints for the purpose of modulating pain, promoting relaxation,  increasing ROM, reducing/eliminating soft tissue swelling/inflammation/restriction, improving soft tissue extensibility and allowing for proper ROM for normal function with self care, mobility, lifting and ambulation. Modalities:  [] (61634) Vasopneumatic compression: Utilized vasopneumatic compression to decrease edema / swelling for the purpose of improving mobility and quad tone / recruitment which will allow for increased overall function including but not limited to self-care, transfers, ambulation, and ascending / descending stairs. Charges:  Timed Code Treatment Minutes: 55   Total Treatment Minutes: 55     [] EVAL - LOW (66828)   [] EVAL - MOD (86370)  [] EVAL - HIGH (77319)  [] RE-EVAL (89240)  [x] AO(85486) x  1     [] Ionto  [x] NMR (85753) x 1      [] Vaso  [x] Manual (05973) x  1     [] Ultrasound  [x] TA x 1       [] Mech Traction (79161)  [] Aquatic Therapy x      [] ES (un) (58041):   [] Home Management Training x  [] ES(attended) (47200)   [] Dry Needling 1-2 muscles (63949):  [] Dry Needling 3+ muscles (591620  [] Group:      [] Other: GAIT TRAINING (79074)    GOALS:   Patient stated goal: regain strength in order to return to work  []? Progressing: []? Met: []? Not Met: []? Adjusted     Therapist goals for Patient:   Short Term Goals: To be achieved in: 2 weeks  1. Independent in HEP and progression per patient tolerance, in order to prevent re-injury. [x]? Progressing: []? Met: []? Not Met: []? Adjusted  2.  Patient will have a decrease in pain of R ankle and decreased fear of wt bearing so he can walk in a normal gym shoe to facilitate improvement in movement, function, and ADLs as indicated by Functional Deficits. [x]? Progressing: []? Met: []? Not Met: []? Adjusted     Long Term Goals: To be achieved in: 6-8 weeks  1. Disability index score of 20% or less for the LEFS to assist with reaching prior level of function. [x]? Progressing: []? Met: []? Not Met: []? Adjusted  2. Patient will demonstrate increased AROM of R ankle DF and PF by at least 10 deg to allow for proper joint functioning for correct heel strike and push off during gait  [x]? Progressing: []? Met: []? Not Met: []? Adjusted  3. Patient will demonstrate an increase in Strength to at least 4+/5 in R ankle as well as good proximal hip strength and control to allow ability to ascend/descend stairs and ladders without difficulty  [x]? Progressing: []? Met: []? Not Met: []? Adjusted  4. Patient will return to functional activities including being able to stand at least 2 hours without increased symptoms or restriction. [x]? Progressing: []? Met: []? Not Met: []? Adjusted  5. Pt will be able to squat and lift heavier objects and walk without significant gait deviation on level and unlevel surfaces   [x]? Progressing: []? Met: []? Not Met: []? Adjusted            Overall Progression Towards Functional goals/ Treatment Progress Update:  [x] Patient is progressing as expected towards functional goals listed. [] Progression is slowed due to complexities/Impairments listed. [] Progression has been slowed due to co-morbidities.   [] Plan just implemented, too soon to assess goals progression <30days    [] Goals require adjustment due to lack of progress  [] Patient is not progressing as expected and requires additional follow up with physician  [] Other    Persisting Functional Limitations/Impairments:  []Sitting []Standing   [x]Walking []Stairs   [x]Transfers []ADLs   [x]Squatting/bending []Kneeling  [x]Housework []Job related tasks  [x]Driving []Sports/Recreation

## 2022-02-16 ENCOUNTER — HOSPITAL ENCOUNTER (OUTPATIENT)
Dept: PHYSICAL THERAPY | Age: 27
Setting detail: THERAPIES SERIES
Discharge: HOME | End: 2022-02-16
Payer: MEDICAID

## 2022-02-16 PROCEDURE — 97112 NEUROMUSCULAR REEDUCATION: CPT

## 2022-02-16 PROCEDURE — 97110 THERAPEUTIC EXERCISES: CPT

## 2022-02-16 PROCEDURE — 97140 MANUAL THERAPY 1/> REGIONS: CPT

## 2022-02-16 NOTE — PROGRESS NOTES
[address] [facility]  Phone: [phone]   Fax: [phone]    Physical Therapy Treatment Note/ Progress Report:     Date:  2022    Patient Name:  Merlinda Rolls    :  1995  MRN: [id number]  Restrictions/Precautions:    Medical/Treatment Diagnosis Information:  Diagnosis: K35.116A (ICD-10-CM) - Closed fracture of right ankle DOI 21  Treatment Diagnosis: R ankle pain, decreased ROM/ flexibility, decreased strength, decreased weight bearing, decreased balance and ambulation s/p R talar fracture and immobilization  Insurance/Certification information:  PT Insurance Information: [employer]; all codes billable; 30 PT pcy   (needs AUTH FOR F/U SESSIONS)  Physician Information:  Referring Practitioner: Amparo Mcmanus MD   (also send notes to ortho doc Padma Gallardo, phone #[phone])    Plan of care signed (Y/N): [x]  Yes 22  []  No     Date of Patient follow up with Physician: Dr Somers Foots surgeon  & PCP Amparo Mcmanus     Progress Report: [x]  Yes  []  No     Date Range for reporting period:  Beginnin22  Endin22    Progress report due (10 Rx/or 30 days whichever is less):       Recertification due (POC duration/ or 90 days whichever is less): 3/22/22     Visit # Insurance Allowable Auth required?  Date Range   8 30 [x]  Yes  []  No pcy       Units approved Units used Date Range   60     23 (as of 22) 22- 22      Latex Allergy:  [x]NO      []YES  Preferred Language for Healthcare:   [x]English       []other:    Functional Scale:        Date assessed:  LEFS: raw score = 39; dysfunction = 51%   22  LEFS: raw score= 47; dysfunction=41%   22    Pain level:   0/10 R ankle     SUBJECTIVE:  Pt reports his R knee and quad are sore since last session and he has been trying to do squats for strengthening    OBJECTIVE:    Pt reports entering clinic in high top sneakers with no AD and mild antalgia.  Leaving clinic, patient was little more tenative with more antalgia present, with leaning more on left leg.  2/14:  2/14/22 ROM STRENGTH     MOTION- R ankle LEFT RIGHT LEFT RIGHT Comments   DF  5 knee straight  10 knee bent  4+    PF  46  3+    INV  28  4+    EV  16  4+    Post tib     4-    Single leg balance    5-10  sec Some difficulty maintaining longitudinal arch       RESTRICTIONS/PRECAUTIONS: Pt is allowed to be FWB as of 1/7      Exercises/Interventions:   Therapeutic Exercise (32067) x 10' Resistance / level Sets/time Reps Notes / Cues    IB stretch standing gastroc  soleus 30\"  30\" 2  1    HR-TR (double leg) standing 1 15    Ecc R HR & lower  1/3\" 6 R Fatigues easily   ANKLE ROM  Alphabet  DF/PF   INV/ EV --  --    BAPS board PWB RLE in standing  DF/PF, INV/EV  CW/CCW       R ankle 4 way TB (long sit)    Seated EV to INV towel sweeps     Tribe board FWB R-ankle circles 1 10 CW/CCW 2/16 added FWB-fatigues    Manual isometrics R toe flexors  2/5\" 6     Leg press calf press DL 80#`  SL 35# 1  3 15  7 R 2/16 ^ wt                               Therapeutic Activities (02784) x 5'   (2/16 billed under TE)       GAIT TRAINING  TM 1.5-1.8 mph  0-8% incline 5'  2/16: working on inc stride length and push off with R foot          SEE OBJ MEASURES & PATIENT ED  --  2/14/22                 Neuromuscular Re-ed (81872) x 20'       Tandem stance    Airex   15\"   2 R/L    SLB Barefoot on level ground   10\" 3 R 2/14 working on doming of arch   Slow march on Airex     Fwd step up to SLS    Lateral step up to SLS    Fwd step down-R foot on step 6 inch 1 10 2/16 ^ step ht   Fwd step up to dls balance on BOSU Blue side up 1/5\" 10 R 2/16 added   Chair squats to 20\"     Rocker board A/P taps in dls  Side taps dls  A/P taps sls  Squats in A/P  Squats in lateral orientation 1  1  1  1  1 20 ea way  20 ea way  15 R  10  10 No UE assist for DLS   Steamboats in R sls on 2\" step  2/9 added   4\" lat heel taps (R foot on step)    4\" fwd step up & over (R foot on step)    TRX squats    SL to 23\"   2   10 R 2/16: decreased depth of squats due to knee pain today. Unable to do fwb R-sls so had L toe on floor                 Manual Intervention (94993) x 12'              PROM & gentle OP R ankle DF/PF, INV/EV, toe flex/ext 4'   PROM in all planes   Joint mobs Gr III-IV distraction and post talar glide; metatarsal glides and subtalar glide to inc  EV/INV  4'   OKC and then in CKC with standing squats   STM IASTM STM to R Achilles and post tib 4'                       Modalities:     Pt. Education:  2/14/22: Rj Maher pt for prog note. Reissued LEFS. Instructed pt to keep working on increasing his walking/standing tolerance. Instructed pt to work on single leg balance activities in [address] now and work on maintaining arch in stance. Discussed continuing PT another 3 wks and then hopefully pt will be ok for RTW.  2/2/22: Advised pt to try to build up his standing tolerance work on at least an hour at a time if tolerated. 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 and if he is no longer on narcotic pain meds  -pt educated on diagnosis, prognosis and expectations for rehab  -all pt questions were answered    Home Exercise Program:  Patient was instructed in the following exercises, performed them in the clinic and was issued a handout:     Access Code: [email or web address]  URL: Alfred/  Date: 01/12/2022  Prepared by:  Monica Alarcon  Exercises  Seated Calf Towel Stretch - 2 x daily - 7 x weekly - 5 reps - 20-30 seconds hold  Seated Heel Raise - 1-2 x daily - 5-7 x weekly - 2-3 sets - 10 reps  Seated Heel Toe Raises - 1-2 x daily - 5-7 x weekly - 2-3 sets - 10 reps  Seated Knee Flexion Stretch - 1-2 x daily - 5-7 x weekly - 10 reps - 10 seconds hold  Standing Weight Shift - 1-2 x daily - 5-7 x weekly - 2-3 sets - 10 reps  Standing Weight Shifting Forward and Backward (in stagger stance) - 1-2 x daily - 7 x weekly - 2-3 sets - 10 reps    Date: 2/7/22: Instructed pt to progress to standing B heel raises and eccentric lowering from R-HR    Date: 2/9/22: Instructed to add active toe flex /ext and toe scrunches with towel    Date: 2/14/22: Instructed pt to add standing runners calf stretch       Therapeutic Exercise and NMR EXR  [x] (99794) Provided verbal/tactile cueing for activities related to strengthening, flexibility, endurance, ROM for improvements in LE, proximal hip, and core control with self care, mobility, lifting, ambulation. [x] (34330) Provided verbal/tactile cueing for activities related to improving balance, coordination, kinesthetic sense, posture, motor skill, proprioception  to assist with LE, proximal hip, and core control in self care, mobility, lifting, ambulation and eccentric single leg control.   [] (60599) Therapist is in constant attendance of 2 or more patients providing skilled therapy interventions, but not providing any significant amount of measurable one-on-one time to either patient, for improvements in LE, proximal hip, and core control in self care, mobility, lifting, ambulation and eccentric single leg control.      NMR and Therapeutic Activities:    [x] (12003 or 00980) Provided verbal/tactile cueing for activities related to improving balance, coordination, kinesthetic sense, posture, motor skill, proprioception and motor activation to allow for proper function of core, proximal hip and LE with self care and ADLs  [] (82830) Gait Re-education- Provided training and instruction to the patient for proper LE, core and proximal hip recruitment and positioning and eccentric body weight control with ambulation re-education including up and down stairs     Home Exercise Program:    [] (61176) Reviewed/Progressed HEP activities related to strengthening, flexibility, endurance, ROM of core, proximal hip and LE for functional self-care, mobility, lifting and ambulation/stair navigation   [] (04096)Reviewed/Progressed HEP activities related to improving balance, coordination, kinesthetic sense, posture, motor skill, proprioception of core, proximal hip and LE for self care, mobility, lifting, and ambulation/stair navigation      Manual Treatments:  PROM / STM / Oscillations-Mobs:  G-I, II, III, IV (PA's, Inf., Post.)  [x] (19142) Provided manual therapy to mobilize LE, proximal hip and/or LS spine soft tissue/joints for the purpose of modulating pain, promoting relaxation,  increasing ROM, reducing/eliminating soft tissue swelling/inflammation/restriction, improving soft tissue extensibility and allowing for proper ROM for normal function with self care, mobility, lifting and ambulation. Modalities:  [] (21121) Vasopneumatic compression: Utilized vasopneumatic compression to decrease edema / swelling for the purpose of improving mobility and quad tone / recruitment which will allow for increased overall function including but not limited to self-care, transfers, ambulation, and ascending / descending stairs. Charges:  Timed Code Treatment Minutes: 47   Total Treatment Minutes: 47     [] EVAL - LOW (92897)   [] EVAL - MOD (64938)  [] EVAL - HIGH (41649)  [] RE-EVAL (85636)  [x] HL(82845) x  1     [] Ionto  [x] NMR (09119) x 1      [] Vaso  [x] Manual (40085) x  1     [] Ultrasound  [] TA x 1       [] Mech Traction (66873)  [] Aquatic Therapy x      [] ES (un) (99190):   [] Home Management Training x  [] ES(attended) (22754)   [] Dry Needling 1-2 muscles (17783):  [] Dry Needling 3+ muscles (968997  [] Group:      [] Other: GAIT TRAINING (60177)    GOALS:   Patient stated goal: regain strength in order to return to work  []? Progressing: []? Met: []? Not Met: []? Adjusted     Therapist goals for Patient:   Short Term Goals: To be achieved in: 2 weeks  1. Independent in HEP and progression per patient tolerance, in order to prevent re-injury. [x]? Progressing: []? Met: []?  Not Met: []? Adjusted  2. Patient will have a decrease in pain of R ankle and decreased fear of wt bearing so he can walk in a normal gym shoe to facilitate improvement in movement, function, and ADLs as indicated by Functional Deficits. [x]? Progressing: []? Met: []? Not Met: []? Adjusted     Long Term Goals: To be achieved in: 6-8 weeks  1. Disability index score of 20% or less for the LEFS to assist with reaching prior level of function. [x]? Progressing: []? Met: []? Not Met: []? Adjusted  2. Patient will demonstrate increased AROM of R ankle DF and PF by at least 10 deg to allow for proper joint functioning for correct heel strike and push off during gait  [x]? Progressing: []? Met: []? Not Met: []? Adjusted  3. Patient will demonstrate an increase in Strength to at least 4+/5 in R ankle as well as good proximal hip strength and control to allow ability to ascend/descend stairs and ladders without difficulty  [x]? Progressing: []? Met: []? Not Met: []? Adjusted  4. Patient will return to functional activities including being able to stand at least 2 hours without increased symptoms or restriction. [x]? Progressing: []? Met: []? Not Met: []? Adjusted  5. Pt will be able to squat and lift heavier objects and walk without significant gait deviation on level and unlevel surfaces   [x]? Progressing: []? Met: []? Not Met: []? Adjusted            Overall Progression Towards Functional goals/ Treatment Progress Update:  [x] Patient is progressing as expected towards functional goals listed. [] Progression is slowed due to complexities/Impairments listed. [] Progression has been slowed due to co-morbidities.   [] Plan just implemented, too soon to assess goals progression <30days    [] Goals require adjustment due to lack of progress  [] Patient is not progressing as expected and requires additional follow up with physician  [] Other    Persisting Functional Limitations/Impairments:  []Sitting []Standing   [x]Walking []Stairs   [x]Transfers []ADLs   [x]Squatting/bending []Kneeling  [x]Housework []Job related tasks  [x]Driving []Sports/Recreation   []Sleeping []Other:    ASSESSMENT:   Pt is tolerating treatment progression pretty well but his calf and quad still fatigues very quickly, and he is still tight in the Achilles tendon. We continued squats with TRX and pt had difficulty with control of single leg squats partly due to the knee weakness as well as the ankle. He was able to do single leg calf press with more resistance today. We included walking on slight incline on th treadmill to work on mobility, stride length, endurance and push off of R foot  Pt can benefit from continuing PT another few weeks for further strengthening and balance and to decrease tightness so pt can hopefully return to work at the [email or web address] (he has to stand all day and do heavy lifting)    Treatment/Activity Tolerance:  [x] Pt able to complete treatment [x] Patient limited by fatique  [] Patient limited by pain  [] Patient limited by other medical complications  [] Other:     Prognosis:    [x] Good [] Fair  [] Poor    Patient Requires Follow-up: [x] Yes  [] No    Return to Play:    [x]  N/A      PLAN: PT 2x / week for 6-8 weeks. [x] Continue per plan of care [] Alter current plan (see comments)  [] Plan of care initiated [] Hold pending MD visit [] Discharge    Electronically signed by: Solange Zelaya, PT / DPT      Note: If patient does not return for scheduled/ recommended follow up visits, this note will serve as a discharge from care along with most recent update on progress.

## 2022-02-21 ENCOUNTER — HOSPITAL ENCOUNTER (OUTPATIENT)
Dept: PHYSICAL THERAPY | Age: 27
Setting detail: THERAPIES SERIES
Discharge: HOME | End: 2022-02-21
Payer: MEDICAID

## 2022-02-21 PROCEDURE — 97110 THERAPEUTIC EXERCISES: CPT

## 2022-02-21 PROCEDURE — 97112 NEUROMUSCULAR REEDUCATION: CPT

## 2022-02-21 NOTE — FLOWSHEET NOTE
[facility]  Phone: [phone]   Fax: [phone]    Physical Therapy Treatment Note/ Progress Report:     Date:  2022    Patient Name:  Seven Romero    :  1995  MRN: [id number]  Restrictions/Precautions:    Medical/Treatment Diagnosis Information:  Diagnosis: I28.179N (ICD-10-CM) - Closed fracture of right ankle DOI 21  Treatment Diagnosis: R ankle pain, decreased ROM/ flexibility, decreased strength, decreased weight bearing, decreased balance and ambulation s/p R talar fracture and immobilization  Insurance/Certification information:  PT Insurance Information: [address]; all codes billable; 30 PT pcy   (needs AUTH FOR F/U SESSIONS)  Physician Information:  Referring Practitioner: Nenita Saeed MD   (also send notes to ortho doc Vero Avendano, phone #[phone])    Plan of care signed (Y/N): [x]  Yes 22  []  No     Date of Patient follow up with Physician: Dr Carranza surgeon  & PCP Nenita Saeed     Progress Report: []  Yes  [x]  No     Date Range for reporting period:  Beginnin22  Endin22    Progress report due (10 Rx/or 30 days whichever is less):   3/7  Recertification due (POC duration/ or 90 days whichever is less): 3/22/22     Visit # Insurance Allowable Auth required? Date Range   9 30 [x]  Yes  []  No pcy       Units approved Units used Date Range   60     25 (as of 22) 22- 22      Latex Allergy:  [x]NO      []YES  Preferred Language for Healthcare:   [x]English       []other:    Functional Scale:        Date assessed:  LEFS: raw score = 39; dysfunction = 51%   22  LEFS: raw score= 47; dysfunction=41%   22    Pain level:   0/10 R ankle     SUBJECTIVE:  Pt reports his R knee and quad are still sore from squats but isn't really hurting in the ankle. Is up to almost tolerating up to 5 hours on feet with a few brief rests.  Some tiredness and soreness in R foot but didn't notice any swelling. Pt is hoping to be able to RTW on 3/8/22 because he has been 6 months without a paycheck     OBJECTIVE:   1/28 Pt reports entering clinic in high top sneakers with no AD and mild antalgia.  Leaving clinic, patient was little more tenative with more antalgia present, with leaning more on left leg.  2/14:  2/14/22 ROM STRENGTH     MOTION- R ankle LEFT RIGHT LEFT RIGHT Comments   DF  5 knee straight  10 knee bent  4+    PF  46  3+    INV  28  4+    EV  16  4+    Post tib     4-    Single leg balance    5-10  sec Some difficulty maintaining longitudinal arch       RESTRICTIONS/PRECAUTIONS: Pt is allowed to be FWB as of 1/7      Exercises/Interventions:   Therapeutic Exercise (75643) x 18' Resistance / level Sets/time Reps Notes / Cues    IB stretch standing gastroc  soleus 30\"  30\" 2  1    HR-TR (double leg) standing 1 15    Ecc R HR & lower  1/3\" 6 R Fatigues easily   ANKLE ROM  Alphabet  DF/PF   INV/ EV --  --    BAPS board PWB RLE in standing  DF/PF, INV/EV  CW/CCW       R ankle 4 way TB (long sit)    Seated EV to INV towel sweeps     [place] board FWB R-ankle circles 1 12-15 CW/CCW 2/16 added FWB-fatigues    Manual isometrics R toe flexors  2/5\" 6     Leg press  Leg #  Calf DL 80#`   Calf SL 35# 2  2  2 10  10  10 R 2/21                               Therapeutic Activities (74393) x           --  2/16: working on inc stride length and push off with R foot          SEE OBJ MEASURES & PATIENT ED  --  2/14/22                 Neuromuscular Re-ed (03506) x 25'       Tandem stance    Airex   15\"   2 R/L    SLB Slow march on Airex     Fwd step up to SLS    Lateral step up to SLS    Fwd step down-R foot on step 6 inch 1 10 2/16 ^ step ht   Lunge balance on BOSU  1/5\" 10 R 2/21 added   Fwd step up to SLB balance on BOSU Blue side up 1/5\" 10 R 2/21 added SLB   Chair squats to 20\"     Rocker board A/P taps in dls  Side taps dls  A/P taps sls  Squats in A/P  Squats in lateral orientation 1  1 1 1 20 ea way  20 R  10 R  10 No UE assist for DLS   Steamboats in R sls on 2\" step  2/9 added   4\" lat heel taps (R foot on step)    4\" fwd step up & over (R foot on step)    TRX squats    SL to 23\"   2   10 R 2/21: decreased depth of squats due to knee pain today. Unable to do fwb R-sls so had L toe on floor   Steamboats (3 way) in stance on R foot with LLE kicks Airex pad 2 10  2/21          Manual Intervention (45863) x 5'              PROM & gentle OP Joint mobs STM IASTM HG8 to R Achilles, distal peroneals and post tib 5'                       Modalities:     Pt. Education:  2/14/22: Gerhardt Beal pt for prog note. Reissued LEFS. Instructed pt to keep working on increasing his walking/standing tolerance. Instructed pt to work on single leg balance activities in [address] now and work on maintaining arch in stance. Discussed continuing PT another 3 wks and then hopefully pt will be ok for RTW.  2/2/22: Advised pt to try to build up his standing tolerance work on at least an hour at a time if tolerated. 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 and if he is no longer on narcotic pain meds  -pt educated on diagnosis, prognosis and expectations for rehab  -all pt questions were answered    Home Exercise Program:  Patient was instructed in the following exercises, performed them in the clinic and was issued a handout:     Access Code: [email or web address]  URL: [address]/  Date: 01/12/2022  Prepared by:  Monica Alarcon  Exercises  Seated Calf Towel Stretch - 2 x daily - 7 x weekly - 5 reps - 20-30 seconds hold  Seated Heel Raise - 1-2 x daily - 5-7 x weekly - 2-3 sets - 10 reps  Seated Heel Toe Raises - 1-2 x daily - 5-7 x weekly - 2-3 sets - 10 reps  Seated Knee Flexion Stretch - 1-2 x daily - 5-7 x weekly - 10 reps - 10 seconds hold  Standing Weight Shift - 1-2 x daily - 5-7 x weekly - 2-3 sets - 10 reps  Standing Weight Shifting Forward and Backward (in stagger stance) - 1-2 x daily - 7 x weekly - 2-3 sets - 10 reps    Date: 2/7/22: Instructed pt to progress to standing B heel raises and eccentric lowering from R-HR    Date: 2/9/22: Instructed to add active toe flex /ext and toe scrunches with towel    Date: 2/14/22: Instructed pt to add standing runners calf stretch       Therapeutic Exercise and NMR EXR  [x] (78263) Provided verbal/tactile cueing for activities related to strengthening, flexibility, endurance, ROM for improvements in LE, proximal hip, and core control with self care, mobility, lifting, ambulation. [x] (35695) Provided verbal/tactile cueing for activities related to improving balance, coordination, kinesthetic sense, posture, motor skill, proprioception  to assist with LE, proximal hip, and core control in self care, mobility, lifting, ambulation and eccentric single leg control.   [] (58562) Therapist is in constant attendance of 2 or more patients providing skilled therapy interventions, but not providing any significant amount of measurable one-on-one time to either patient, for improvements in LE, proximal hip, and core control in self care, mobility, lifting, ambulation and eccentric single leg control.      NMR and Therapeutic Activities:    [] (37654 or 29132) Provided verbal/tactile cueing for activities related to improving balance, coordination, kinesthetic sense, posture, motor skill, proprioception and motor activation to allow for proper function of core, proximal hip and LE with self care and ADLs  [] (90456) Gait Re-education- Provided training and instruction to the patient for proper LE, core and proximal hip recruitment and positioning and eccentric body weight control with ambulation re-education including up and down stairs     Home Exercise Program:    [] (39772) Reviewed/Progressed HEP activities related to strengthening, flexibility, endurance, ROM of core, proximal hip and LE for functional self-care, mobility, lifting and ambulation/stair navigation   [] (36800)Reviewed/Progressed HEP activities related to improving balance, coordination, kinesthetic sense, posture, motor skill, proprioception of core, proximal hip and LE for self care, mobility, lifting, and ambulation/stair navigation      Manual Treatments:  PROM / STM / Oscillations-Mobs:  G-I, II, III, IV (PA's, Inf., Post.)  [x] (31779) Provided manual therapy to mobilize LE, proximal hip and/or LS spine soft tissue/joints for the purpose of modulating pain, promoting relaxation,  increasing ROM, reducing/eliminating soft tissue swelling/inflammation/restriction, improving soft tissue extensibility and allowing for proper ROM for normal function with self care, mobility, lifting and ambulation. Modalities:  [] (72274) Vasopneumatic compression: Utilized vasopneumatic compression to decrease edema / swelling for the purpose of improving mobility and quad tone / recruitment which will allow for increased overall function including but not limited to self-care, transfers, ambulation, and ascending / descending stairs. Charges:  Timed Code Treatment Minutes: 48   Total Treatment Minutes: 48     [] EVAL - LOW (45206)   [] EVAL - MOD (23878)  [] EVAL - HIGH (68159)  [] RE-EVAL (73440)  [x] FE(02357) x  1     [] Ionto  [x] NMR (75510) x 2      [] Vaso  [] Manual (47011) x       [] Ultrasound  [] TA x 1       [] Mech Traction (60473)  [] Aquatic Therapy x      [] ES (un) (49143):   [] Home Management Training x  [] ES(attended) (92375)   [] Dry Needling 1-2 muscles (92607):  [] Dry Needling 3+ muscles (513477  [] Group:      [] Other: GAIT TRAINING (54530)    GOALS:   Patient stated goal: regain strength in order to return to work  []? Progressing: []? Met: []? Not Met: []? Adjusted     Therapist goals for Patient:   Short Term Goals: To be achieved in: 2 weeks  1. Independent in HEP and progression per patient tolerance, in order to prevent re-injury. [x]? Progressing: []? Met: []?  Not Met: []? Adjusted  2. Patient will have a decrease in pain of R ankle and decreased fear of wt bearing so he can walk in a normal gym shoe to facilitate improvement in movement, function, and ADLs as indicated by Functional Deficits. [x]? Progressing: []? Met: []? Not Met: []? Adjusted     Long Term Goals: To be achieved in: 6-8 weeks  1. Disability index score of 20% or less for the LEFS to assist with reaching prior level of function. [x]? Progressing: []? Met: []? Not Met: []? Adjusted  2. Patient will demonstrate increased AROM of R ankle DF and PF by at least 10 deg to allow for proper joint functioning for correct heel strike and push off during gait  [x]? Progressing: []? Met: []? Not Met: []? Adjusted  3. Patient will demonstrate an increase in Strength to at least 4+/5 in R ankle as well as good proximal hip strength and control to allow ability to ascend/descend stairs and ladders without difficulty  [x]? Progressing: []? Met: []? Not Met: []? Adjusted  4. Patient will return to functional activities including being able to stand at least 2 hours without increased symptoms or restriction. [x]? Progressing: []? Met: []? Not Met: []? Adjusted  5. Pt will be able to squat and lift heavier objects and walk without significant gait deviation on level and unlevel surfaces   [x]? Progressing: []? Met: []? Not Met: []? Adjusted            Overall Progression Towards Functional goals/ Treatment Progress Update:  [x] Patient is progressing as expected towards functional goals listed. [] Progression is slowed due to complexities/Impairments listed. [] Progression has been slowed due to co-morbidities.   [] Plan just implemented, too soon to assess goals progression <30days    [] Goals require adjustment due to lack of progress  [] Patient is not progressing as expected and requires additional follow up with physician  [] Other    Persisting Functional Limitations/Impairments:  []Sitting []Standing   [x]Walking []Stairs   [x]Transfers []ADLs   [x]Squatting/bending []Kneeling  [x]Housework []Job related tasks  [x]Driving []Sports/Recreation   []Sleeping []Other:    ASSESSMENT:   Pt is tolerating treatment progression well but has some mild Achilles tendon irritation. We addressed this with IASTM. Treatment today focused on NMR and strengthening. Pt can benefit from continuing PT another few weeks for further strengthening and balance and to decrease tightness so pt can hopefully return to work at the [employer] (he has to stand all day and do heavy lifting)    Treatment/Activity Tolerance:  [x] Pt able to complete treatment [x] Patient limited by fatique  [] Patient limited by pain  [] Patient limited by other medical complications  [] Other:     Prognosis:    [x] Good [] Fair  [] Poor    Patient Requires Follow-up: [x] Yes  [] No    Return to Play:    [x]  N/A      PLAN: PT 2x / week for 6-8 weeks. [x] Continue per plan of care [] Alter current plan (see comments)  [] Plan of care initiated [] Hold pending MD visit [] Discharge    Electronically signed by: Samantha Ro PT / DPT      Note: If patient does not return for scheduled/ recommended follow up visits, this note will serve as a discharge from care along with most recent update on progress.

## 2022-02-23 ENCOUNTER — HOSPITAL ENCOUNTER (OUTPATIENT)
Dept: PHYSICAL THERAPY | Age: 27
Setting detail: THERAPIES SERIES
Discharge: HOME | End: 2022-02-23
Payer: MEDICAID

## 2022-02-23 NOTE — FLOWSHEET NOTE
[address] [facility]    Physical Therapy  Cancellation/No-show Note  Patient Name:  Durwin Sandhoff  :  1995   Date:  2022    Cancelled visits to date: 1  No-shows to date: 0    For today's appointment patient:  [x]  Cancelled 22  []  Rescheduled appointment  []  No-show     Reason given by patient:  []  Patient ill  []  Conflicting appointment  []  No transportation    []  Conflict with work  [x]  No reason given  []  Other:     Comments:      Phone call information:   []  Phone call made today to patient at _ time at number provided:      []  Patient answered, conversation as follows:    []  Patient did not answer, message left as follows:  []  Phone call not made today  [x]  Phone call not needed - pt contacted us to cancel and provided reason for cancellation. Electronically signed by:   Canelo Jordan PT

## 2022-02-28 ENCOUNTER — HOSPITAL ENCOUNTER (OUTPATIENT)
Dept: PHYSICAL THERAPY | Age: 27
Setting detail: THERAPIES SERIES
Discharge: HOME | End: 2022-02-28
Payer: MEDICAID

## 2022-02-28 PROCEDURE — 97112 NEUROMUSCULAR REEDUCATION: CPT

## 2022-02-28 PROCEDURE — 97140 MANUAL THERAPY 1/> REGIONS: CPT

## 2022-02-28 PROCEDURE — 97530 THERAPEUTIC ACTIVITIES: CPT

## 2022-02-28 NOTE — FLOWSHEET NOTE
[address] [facility]  Phone: [phone]   Fax: [phone]    Physical Therapy Treatment Note/ Progress Report:     Date:  2022    Patient Name:  Keara Disa    :  1995  MRN: [id number]  Restrictions/Precautions:    Medical/Treatment Diagnosis Information:  Diagnosis: V67.498F (ICD-10-CM) - Closed fracture of right ankle DOI 21  Treatment Diagnosis: R ankle pain, decreased ROM/ flexibility, decreased strength, decreased weight bearing, decreased balance and ambulation s/p R talar fracture and immobilization  Insurance/Certification information:  PT Insurance Information: [employer]; all codes billable; 30 PT pcy   (needs AUTH FOR F/U SESSIONS)  Physician Information:  Referring Practitioner: Radha Ruth MD   (also send notes to ortho doc Twan Lim, phone #[phone])    Plan of care signed (Y/N): [x]  Yes 22  []  No     Date of Patient follow up with Physician: Dr Mine Clifford surgeon  & PCP Radha Ruth     Progress Report: []  Yes  [x]  No     Date Range for reporting period:  Beginnin22  Endin22    Progress report due (10 Rx/or 30 days whichever is less):   3/7  Recertification due (POC duration/ or 90 days whichever is less): 3/22/22     Visit # Insurance Allowable Auth required?  Date Range   10 30 [x]  Yes  []  No pcy       Units approved Units used Date Range   60     29 (as of 22) 22- 22      Latex Allergy:  [x]NO      []YES  Preferred Language for Healthcare:   [x]English       []other:    Functional Scale:        Date assessed:  LEFS: raw score = 39; dysfunction = 51%   22  LEFS: raw score= 47; dysfunction=41%   22    Pain level:   1/10 R ankle     SUBJECTIVE:  Pt reports he missed last Wed PT appt because on Tues he rolled his R ankle when he stepped on a cat toy and it was flared up a bit (became discolored and a little swollen) so he didn't want to drive with it. He did not fall.   It has since settled back down but he still has a little pain in post/lateral ankle    OBJECTIVE:     2/14/22 ROM STRENGTH     MOTION- R ankle LEFT RIGHT LEFT RIGHT Comments   DF  5 knee straight  10 knee bent  4+    PF  46  3+    INV  28  4+    EV  16  4+    Post tib     4-    Single leg balance    5-10  sec Some difficulty maintaining longitudinal arch       RESTRICTIONS/PRECAUTIONS: Pt is allowed to be FWB as of 1/7      Exercises/Interventions:   Therapeutic Exercise (60422) x 20' Resistance / level Sets/time Reps Notes / Cues    IB stretch standing gastroc  soleus 30\"  30\" 3  1    HR-TR (double leg) standing 1 15    Ecc R HR & lower  1/3\" 6 R Fatigues easily   ANKLE ROM  Alphabet  DF/PF   INV/ EV --  --    BAPS board PWB RLE in standing  DF/PF, INV/EV  CW/CCW       R ankle 3 way TB (long sit) DF, INV, EV 2 15 R ea way 2/28 ^ resistance    Seated EV to INV towel sweeps     [place] board FWB R-ankle circles 1 12-15 CW/CCW 2/16 added FWB-fatigues    Manual isometrics R toe flexors  2/5\" 6     Leg press  Leg #  Calf DL 80#`   Calf SL 35# 2  1  2 10  10  10 R 2/28 ^ wt                               Therapeutic Activities (75535) x           --  2/16: working on inc stride length and push off with R foot          SEE OBJ MEASURES & PATIENT ED  --  2/14/22                 Neuromuscular Re-ed (21877) x 30'       Tandem stance    Airex   15\"   2 R/L    SLB Slow march on Airex     Fwd step up to SLS    Lateral step up to SLS    Fwd step down-R foot on step 2/16 ^ step ht   Lunge balance on BOSU  1/5\" 10 R    Fwd step up to SLB balance on BOSU Blue side up 1/5\" 12 R    Chair squats to 20\"     Rocker board A/P taps in dls  Side taps dls  A/P taps sls  Squats in A/P  Squats in lateral orientation 1  1  1  1 20 ea way  20 R  10 R  10 No UE assist for DLS   Steamboats in R sls on 2\" step  2/9 added   4\" lat heel taps (R foot on step)    4\" fwd step up & over (R foot on step)    TRX squats SL    2   10 R 2/28 pt able to do without L foot touching floor   Steamboats (3 way) in stance on R foot with LLE kicks Airex pad 2 15 2/28 ^ reps   Side lunge to push off to SLB One yellow sport card 1/5\" R/L 2/28 added   KB deep squats 20# 2 10 2/28 added          Manual Intervention (18634) x 12'               PROM & gentle OP Joint mobs Gr III-IV distraction and post talar glide;   4' STM IASTM HG7 to R Achilles, distal peroneals and post tib 4'     K tape to R ankle I-strip to peroneals 25% pull; Stirrup I strip to support lateral ankle 50% pull 4'                Modalities:     Pt. Education:  2/14/22: Sherry Palm pt for prog note. Reissued LEFS. Instructed pt to keep working on increasing his walking/standing tolerance. Instructed pt to work on single leg balance activities in [address] now and work on maintaining arch in stance. Discussed continuing PT another 3 wks and then hopefully pt will be ok for RTW.  2/2/22: Advised pt to try to build up his standing tolerance work on at least an hour at a time if tolerated. 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  Date: 01/12/2022  Prepared by:  Monica Alarcon  Exercises  Seated Calf Towel Stretch - 2 x daily - 7 x weekly - 5 reps - 20-30 seconds hold  Seated Heel Raise - 1-2 x daily - 5-7 x weekly - 2-3 sets - 10 reps  Seated Heel Toe Raises - 1-2 x daily - 5-7 x weekly - 2-3 sets - 10 reps  Seated Knee Flexion Stretch - 1-2 x daily - 5-7 x weekly - 10 reps - 10 seconds hold  Standing Weight Shift - 1-2 x daily - 5-7 x weekly - 2-3 sets - 10 reps  Standing Weight Shifting Forward and Backward (in stagger stance) - 1-2 x daily - 7 x weekly - 2-3 sets - 10 reps    Date: 2/7/22: Instructed pt to progress to standing B heel raises and eccentric lowering from R-HR    Date: 2/9/22: Instructed to add active toe flex /ext and toe scrunches with towel    Date: 2/14/22: Instructed pt to add standing runners calf stretch       Therapeutic Exercise and NMR EXR  [x] (54474) Provided verbal/tactile cueing for activities related to strengthening, flexibility, endurance, ROM for improvements in LE, proximal hip, and core control with self care, mobility, lifting, ambulation. [x] (58381) Provided verbal/tactile cueing for activities related to improving balance, coordination, kinesthetic sense, posture, motor skill, proprioception  to assist with LE, proximal hip, and core control in self care, mobility, lifting, ambulation and eccentric single leg control.   [] (83219) Therapist is in constant attendance of 2 or more patients providing skilled therapy interventions, but not providing any significant amount of measurable one-on-one time to either patient, for improvements in LE, proximal hip, and core control in self care, mobility, lifting, ambulation and eccentric single leg control.      NMR and Therapeutic Activities:    [] (73662 or 40231) Provided verbal/tactile cueing for activities related to improving balance, coordination, kinesthetic sense, posture, motor skill, proprioception and motor activation to allow for proper function of core, proximal hip and LE with self care and ADLs  [] (66453) Gait Re-education- Provided training and instruction to the patient for proper LE, core and proximal hip recruitment and positioning and eccentric body weight control with ambulation re-education including up and down stairs     Home Exercise Program:    [] (87148) Reviewed/Progressed HEP activities related to strengthening, flexibility, endurance, ROM of core, proximal hip and LE for functional self-care, mobility, lifting and ambulation/stair navigation   [] (73383)Reviewed/Progressed HEP activities related to improving balance, coordination, kinesthetic sense, posture, motor skill, proprioception of core, proximal hip and LE for self care, mobility, lifting, and ambulation/stair navigation      Manual Treatments:  PROM / STM / Oscillations-Mobs:  G-I, II, III, IV (PA's, Inf., Post.)  [x] (42751) Provided manual therapy to mobilize LE, proximal hip and/or LS spine soft tissue/joints for the purpose of modulating pain, promoting relaxation,  increasing ROM, reducing/eliminating soft tissue swelling/inflammation/restriction, improving soft tissue extensibility and allowing for proper ROM for normal function with self care, mobility, lifting and ambulation. Modalities:  [] (14203) Vasopneumatic compression: Utilized vasopneumatic compression to decrease edema / swelling for the purpose of improving mobility and quad tone / recruitment which will allow for increased overall function including but not limited to self-care, transfers, ambulation, and ascending / descending stairs. Charges:  Timed Code Treatment Minutes: 62   Total Treatment Minutes: 62     [] EVAL - LOW (74086)   [] EVAL - MOD (34791)  [] EVAL - HIGH (99174)  [] RE-EVAL (40809)  [x] WG(72324) x  1     [] Ionto  [x] NMR (75064) x 2      [] Vaso  [x] Manual (60473) x  1     [] Ultrasound  [] TA x 1       [] Mech Traction (06080)  [] Aquatic Therapy x      [] ES (un) (40300):   [] Home Management Training x  [] ES(attended) (22548)   [] Dry Needling 1-2 muscles (36268):  [] Dry Needling 3+ muscles (273067  [] Group:      [] Other: GAIT TRAINING (39967)    GOALS:   Patient stated goal: regain strength in order to return to work  []? Progressing: []? Met: []? Not Met: []? Adjusted     Therapist goals for Patient:   Short Term Goals: To be achieved in: 2 weeks  1. Independent in HEP and progression per patient tolerance, in order to prevent re-injury. [x]? Progressing: []? Met: []?  Not Met: []? Adjusted  2. Patient will have a decrease in pain of R ankle and decreased fear of wt bearing so he can walk in a normal gym shoe to facilitate improvement in movement, function, and ADLs as indicated by Functional Deficits. [x]? Progressing: []? Met: []? Not Met: []? Adjusted     Long Term Goals: To be achieved in: 6-8 weeks  1. Disability index score of 20% or less for the LEFS to assist with reaching prior level of function. [x]? Progressing: []? Met: []? Not Met: []? Adjusted  2. Patient will demonstrate increased AROM of R ankle DF and PF by at least 10 deg to allow for proper joint functioning for correct heel strike and push off during gait  [x]? Progressing: []? Met: []? Not Met: []? Adjusted  3. Patient will demonstrate an increase in Strength to at least 4+/5 in R ankle as well as good proximal hip strength and control to allow ability to ascend/descend stairs and ladders without difficulty  [x]? Progressing: []? Met: []? Not Met: []? Adjusted  4. Patient will return to functional activities including being able to stand at least 2 hours without increased symptoms or restriction. [x]? Progressing: []? Met: []? Not Met: []? Adjusted  5. Pt will be able to squat and lift heavier objects and walk without significant gait deviation on level and unlevel surfaces   [x]? Progressing: []? Met: []? Not Met: []? Adjusted            Overall Progression Towards Functional goals/ Treatment Progress Update:  [x] Patient is progressing as expected towards functional goals listed. [] Progression is slowed due to complexities/Impairments listed. [] Progression has been slowed due to co-morbidities.   [] Plan just implemented, too soon to assess goals progression <30days    [] Goals require adjustment due to lack of progress  [] Patient is not progressing as expected and requires additional follow up with physician  [] Other    Persisting Functional Limitations/Impairments:  []Sitting []Standing   [x]Walking []Stairs   [x]Transfers []ADLs   [x]Squatting/bending []Kneeling  [x]Housework []Job related tasks  [x]Driving []Sports/Recreation   []Sleeping []Other:    ASSESSMENT:  We are focusing mostly on strength /stability of R ankle and pt is tolerating the progression pretty well. He still has some R Achilles irritation but it is mild. His gait appears normal on level surfaces. Advised pt to try going outside and doing a sustained 30 minute walk to see how his ankle tolerates it. Did some ligamentous laxity tests but no new laxity was present from rolling ankle last week. His peroneals to do fatigue more quickly than any other muscles in the foot /ankle. We added more closed chain activities to help improve balance and strength. Pt tolerated these without increased problems. We applied a couple strips of K-tape to the ankle for proprioceptive awareness and support. Continue PT     Treatment/Activity Tolerance:  [x] Pt able to complete treatment [] Patient limited by fatique  [] Patient limited by pain  [] Patient limited by other medical complications  [] Other:     Prognosis:    [x] Good [] Fair  [] Poor    Patient Requires Follow-up: [x] Yes  [] No    Return to Play:    [x]  N/A      PLAN: PT 2x / week for 6-8 weeks. [x] Continue per plan of care [] Alter current plan (see comments)  [] Plan of care initiated [] Hold pending MD visit [] Discharge    Electronically signed by: Issa Cantu PT / DPT      Note: If patient does not return for scheduled/ recommended follow up visits, this note will serve as a discharge from care along with most recent update on progress.

## 2022-03-02 ENCOUNTER — HOSPITAL ENCOUNTER (OUTPATIENT)
Dept: PHYSICAL THERAPY | Age: 27
Setting detail: THERAPIES SERIES
Discharge: HOME | End: 2022-03-02
Payer: MEDICAID

## 2022-03-02 PROCEDURE — 97140 MANUAL THERAPY 1/> REGIONS: CPT

## 2022-03-02 PROCEDURE — 97112 NEUROMUSCULAR REEDUCATION: CPT

## 2022-03-02 PROCEDURE — 97110 THERAPEUTIC EXERCISES: CPT

## 2022-03-02 NOTE — FLOWSHEET NOTE
[address] [facility]  Phone: [phone]   Fax: [phone]    Physical Therapy Treatment Note/ Progress Report:     Date:  3/2/2022    Patient Name:  Trent Lopez    :  1995  MRN: [id number]  Restrictions/Precautions:    Medical/Treatment Diagnosis Information:  Diagnosis: S70.177O (ICD-10-CM) - Closed fracture of right ankle DOI 21  Treatment Diagnosis: R ankle pain, decreased ROM/ flexibility, decreased strength, decreased weight bearing, decreased balance and ambulation s/p R talar fracture and immobilization  Insurance/Certification information:  PT Insurance Information: [employer]; all codes billable; 30 PT pcy   (needs AUTH FOR F/U SESSIONS)  Physician Information:  Referring Practitioner: Randa Mccray MD   (also send notes to ortho doc [facility], phone #[phone])    Plan of care signed (Y/N): [x]  Yes 22  []  No     Date of Patient follow up with Physician: Dr Kirt Hardy surgeon  & PCP Randa Mccray     Progress Report: []  Yes  [x]  No     Date Range for reporting period:  Beginnin22  Endin22    Progress report due (10 Rx/or 30 days whichever is less):   3/7 (final session)  Recertification due (POC duration/ or 90 days whichever is less): n/a    Visit # Insurance Allowable Auth required? Date Range   11 30 [x]  Yes  []  No pcy       Units approved Units used Date Range   60     35 (as of 3/2/22) 22- 22      Latex Allergy:  [x]NO      []YES  Preferred Language for Healthcare:   [x]English       []other:    Functional Scale:        Date assessed:  LEFS: raw score = 39; dysfunction = 51%   22  LEFS: raw score= 47; dysfunction=41%   22    Pain level:   0-1/10 R ankle     SUBJECTIVE:  Pt reports ankle is feeling much better since last session. K-tape helped. Tolerating more time on his feet. Able to do stairs in reciprocal pattern now.  Still occasional pain in R achilles tendon and also in R knee. Pt plans to begin RTW 3/8.  Pt states he bought a [place] psicofxp board so he can do some wt bearing/balance activities at home    OBJECTIVE:     2/14/22 ROM STRENGTH     MOTION- R ankle LEFT RIGHT LEFT RIGHT Comments   DF  5 knee straight  10 knee bent  4+    PF  46  3+    INV  28  4+    EV  16  4+    Post tib     4-    Single leg balance    5-10  sec Some difficulty maintaining longitudinal arch       RESTRICTIONS/PRECAUTIONS: Pt is allowed to be FWB as of 1/7      Exercises/Interventions:   Therapeutic Exercise (30538) x 15' Resistance / level Sets/time Reps Notes / Cues    IB stretch standing gastroc  soleus 30\"  30\" 3  1    HR-TR (double leg) standing 1 15    Ecc R HR & lower  2 5 R Fatigues easily, mild pain   ANKLE ROM  Alphabet  DF/PF   INV/ EV --  --    BAPS board PWB RLE in standing  DF/PF, INV/EV  CW/CCW       R ankle 3 way TB (long sit) DF, INV, EV 2 15 R ea way 2/28 ^ resistance    Seated EV to INV towel sweeps     [place] board 2/16 added FWB-fatigues    Manual isometrics R toe flexors  2/5\" 6     Leg press  Leg #  Calf DL 85#`   Calf SL 35# 1  2  2 15  10  10 R 3/2 ^ wt                               Therapeutic Activities (59845) x           --  2/16: working on inc stride length and push off with R foot          SEE OBJ MEASURES & PATIENT ED  --  2/14/22                 Neuromuscular Re-ed (26170) x 30'       Tandem stance    Airex   15\"   2 R/L    SLB Slow march on Airex     Fwd step up to SLS    Lateral step up to SLS    Fwd step down-R foot on step 2/16 ^ step ht   Lunge balance on BOSU  1/5\" 10 R    Fwd step up to SLB balance on BOSU Blue side up 1/5\" 12 R    Lateral shifts balance on BOSU Black side up 1 20 R/L 3/2   DL squat balance on BOSU Black side up 1 12 3/2   Chair squats to 20\"     Rocker board A/P taps in dls    A/P taps sls       1     20 R   No UE assist for DLS         lat heel taps (R foot on step) w/ TRX 6 inch 2 15 3/2 ^ step ht  And added TRX   4\" fwd step up & over (R foot on step)    TRX squats           2/28 pt able to do without L foot touching floor   Steamboats (3 way) in stance on R foot with LLE kicks Airex pad 2 15 ea way 2/28 ^ reps   Side lunge to push off to SLB  yellow sport card x 2 1/5\"  10 R/L 3/2 ^ resistance   KB deep squats 20# 2 10 2/28 added   4 way resisted walkouts 2 yellow sport cords 5 5 steps ea direction 3/2 added                 Manual Intervention (53782) x 10'               PROM & gentle OP Joint mobs Gr IV distraction and post talar glide;   5' STM IASTM Edge tool to R Achilles, distal peroneals and post tib 5'     K tape to R ankle                Modalities:     Pt. Education:  2/14/22: Josefina Harp pt for prog note. Reissued LEFS. Instructed pt to keep working on increasing his walking/standing tolerance. Instructed pt to work on single leg balance activities in [place] now and work on maintaining arch in stance. Discussed continuing PT another 3 wks and then hopefully pt will be ok for RTW.  2/2/22: Advised pt to try to build up his standing tolerance work on at least an hour at a time if tolerated. 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  Date: 01/12/2022  Prepared by:  Monica Alarcon  Exercises  Seated Calf Towel Stretch - 2 x daily - 7 x weekly - 5 reps - 20-30 seconds hold  Seated Heel Raise - 1-2 x daily - 5-7 x weekly - 2-3 sets - 10 reps  Seated Heel Toe Raises - 1-2 x daily - 5-7 x weekly - 2-3 sets - 10 reps  Seated Knee Flexion Stretch - 1-2 x daily - 5-7 x weekly - 10 reps - 10 seconds hold  Standing Weight Shift - 1-2 x daily - 5-7 x weekly - 2-3 sets - 10 reps  Standing Weight Shifting Forward and Backward (in stagger stance) - 1-2 x daily - 7 x weekly - 2-3 sets - 10 reps    Date: 2/7/22: Instructed pt to progress to standing B heel raises and eccentric lowering from R-HR    Date: 2/9/22: Instructed to add active toe flex /ext and toe scrunches with towel    Date: 2/14/22: Instructed pt to add standing runners calf stretch       Therapeutic Exercise and NMR EXR  [x] (19595) Provided verbal/tactile cueing for activities related to strengthening, flexibility, endurance, ROM for improvements in LE, proximal hip, and core control with self care, mobility, lifting, ambulation. [x] (35636) Provided verbal/tactile cueing for activities related to improving balance, coordination, kinesthetic sense, posture, motor skill, proprioception  to assist with LE, proximal hip, and core control in self care, mobility, lifting, ambulation and eccentric single leg control.   [] (95927) Therapist is in constant attendance of 2 or more patients providing skilled therapy interventions, but not providing any significant amount of measurable one-on-one time to either patient, for improvements in LE, proximal hip, and core control in self care, mobility, lifting, ambulation and eccentric single leg control.      NMR and Therapeutic Activities:    [] (70364 or 16562) Provided verbal/tactile cueing for activities related to improving balance, coordination, kinesthetic sense, posture, motor skill, proprioception and motor activation to allow for proper function of core, proximal hip and LE with self care and ADLs  [] (53102) Gait Re-education- Provided training and instruction to the patient for proper LE, core and proximal hip recruitment and positioning and eccentric body weight control with ambulation re-education including up and down stairs     Home Exercise Program:    [] (23105) Reviewed/Progressed HEP activities related to strengthening, flexibility, endurance, ROM of core, proximal hip and LE for functional self-care, mobility, lifting and ambulation/stair navigation   [] (90392)Reviewed/Progressed HEP activities related to improving balance, coordination, kinesthetic sense, posture, motor skill, proprioception of core, proximal hip and LE for self care, mobility, lifting, and ambulation/stair navigation      Manual Treatments:  PROM / STM / Oscillations-Mobs:  G-I, II, III, IV (PA's, Inf., Post.)  [x] (26101) Provided manual therapy to mobilize LE, proximal hip and/or LS spine soft tissue/joints for the purpose of modulating pain, promoting relaxation,  increasing ROM, reducing/eliminating soft tissue swelling/inflammation/restriction, improving soft tissue extensibility and allowing for proper ROM for normal function with self care, mobility, lifting and ambulation. Modalities:  [] (07865) Vasopneumatic compression: Utilized vasopneumatic compression to decrease edema / swelling for the purpose of improving mobility and quad tone / recruitment which will allow for increased overall function including but not limited to self-care, transfers, ambulation, and ascending / descending stairs. Charges:  Timed Code Treatment Minutes: 55   Total Treatment Minutes: 55     [] EVAL - LOW (71548)   [] EVAL - MOD (50561)  [] EVAL - HIGH (24975)  [] RE-EVAL (47902)  [x] SG(17700) x  1     [] Ionto  [x] NMR (40655) x 2      [] Vaso  [x] Manual (30327) x  1     [] Ultrasound  [] TA x 1       [] Mech Traction (71226)  [] Aquatic Therapy x      [] ES (un) (45310):   [] Home Management Training x  [] ES(attended) (13746)   [] Dry Needling 1-2 muscles (30044):  [] Dry Needling 3+ muscles (963183  [] Group:      [] Other: GAIT TRAINING (40615)    GOALS:   Patient stated goal: regain strength in order to return to work  []? Progressing: []? Met: []? Not Met: []? Adjusted     Therapist goals for Patient:   Short Term Goals: To be achieved in: 2 weeks  1.  Independent in HEP and progression per patient tolerance, in order to prevent re-injury. [x]? Progressing: []? Met: []? Not Met: []? Adjusted  2. Patient will have a decrease in pain of R ankle and decreased fear of wt bearing so he can walk in a normal gym shoe to facilitate improvement in movement, function, and ADLs as indicated by Functional Deficits. [x]? Progressing: []? Met: []? Not Met: []? Adjusted     Long Term Goals: To be achieved in: 6-8 weeks  1. Disability index score of 20% or less for the LEFS to assist with reaching prior level of function. [x]? Progressing: []? Met: []? Not Met: []? Adjusted  2. Patient will demonstrate increased AROM of R ankle DF and PF by at least 10 deg to allow for proper joint functioning for correct heel strike and push off during gait  [x]? Progressing: []? Met: []? Not Met: []? Adjusted  3. Patient will demonstrate an increase in Strength to at least 4+/5 in R ankle as well as good proximal hip strength and control to allow ability to ascend/descend stairs and ladders without difficulty  [x]? Progressing: []? Met: []? Not Met: []? Adjusted  4. Patient will return to functional activities including being able to stand at least 2 hours without increased symptoms or restriction. [x]? Progressing: []? Met: []? Not Met: []? Adjusted  5. Pt will be able to squat and lift heavier objects and walk without significant gait deviation on level and unlevel surfaces   [x]? Progressing: []? Met: []? Not Met: []? Adjusted            Overall Progression Towards Functional goals/ Treatment Progress Update:  [x] Patient is progressing as expected towards functional goals listed. [] Progression is slowed due to complexities/Impairments listed. [] Progression has been slowed due to co-morbidities.   [] Plan just implemented, too soon to assess goals progression <30days    [] Goals require adjustment due to lack of progress  [] Patient is not progressing as expected and requires additional follow up with physician  [] Other    Persisting Functional Limitations/Impairments:  []Sitting []Standing   [x]Walking []Stairs   [x]Transfers []ADLs   [x]Squatting/bending []Kneeling  [x]Housework []Job related tasks  [x]Driving []Sports/Recreation   []Sleeping []Other:    ASSESSMENT:  Pt is improving overall. He was able to do more advanced NMR/ balance activities w/ occasional mild LOB but not a lot of pain. By end of session he reported some soreness in R Achilles tendon and had less push off than when he came in. He still struggles with single leg heel raises because his calf fatigues easily. Pt states he needs to return to work next week due to being without a paycheck since Sept. And his schedule won't allow him to keep coming to PT. He feels like he is doing well enough to stop PT after next session. Treatment/Activity Tolerance:  [x] Pt able to complete treatment [] Patient limited by fatique  [] Patient limited by pain  [] Patient limited by other medical complications  [] Other:     Prognosis:    [x] Good [] Fair  [] Poor    Patient Requires Follow-up: [x] Yes  [] No    Return to Play:    [x]  N/A      PLAN: PT 2x / week for 6-8 weeks. Reassess for D/C prog note next session. Modify HEP if needed. [x] Continue per plan of care [] Alter current plan (see comments)  [] Plan of care initiated [] Hold pending MD visit [] Discharge    Electronically signed by: Markell Fragoso PT / DPT      Note: If patient does not return for scheduled/ recommended follow up visits, this note will serve as a discharge from care along with most recent update on progress.

## 2022-03-07 ENCOUNTER — HOSPITAL ENCOUNTER (OUTPATIENT)
Dept: PHYSICAL THERAPY | Age: 27
Setting detail: THERAPIES SERIES
Discharge: HOME | End: 2022-03-07
Payer: MEDICAID

## 2022-03-07 PROCEDURE — 97530 THERAPEUTIC ACTIVITIES: CPT

## 2022-03-07 PROCEDURE — 97112 NEUROMUSCULAR REEDUCATION: CPT

## 2022-03-07 PROCEDURE — 97110 THERAPEUTIC EXERCISES: CPT

## 2022-03-07 NOTE — PROGRESS NOTES
[facility]  Phone: [phone]   Fax: [phone]     Physical Therapy Progress Note/ Discharge Summary    Dear  ,    We had the pleasure of treating the following patient for physical therapy services at [facility]. A summary of our findings can be found in the discharge summary below. If you have any questions or concerns regarding these findings, please do not hesitate to contact me at the office phone number checked above. Thank you for the referral.     Physician Signature:________________________________Date:__________________  By signing above (or electronic signature), therapists plan is approved by physician      Functional Outcome: LEFS: raw score=69;  Dysfunction=14%   3/7/22    3/7/22 ROM STRENGTH   (bolded measurements are improvements from last measures 3 wks ago)  MOTION- R ankle LEFT RIGHT LEFT RIGHT Comments   DF  5 knee straight  10 knee bent  4+    PF  46  4 Can do 5 reps single leg heel raise before calf fatigues   INV  28  4+    EV  16  4+    Post tib     4    Single leg balance    10-15 seconds Able to balance on level or unlevel surface           Overall Response to Treatment:   [x]Patient is responding well to treatment and improvement is noted with regards  to goals   []Patient should continue to improve in reasonable time if they continue HEP   []Patient has plateaued and is no longer responding to skilled PT intervention    []Patient is getting worse and would benefit from return to referring MD   []Patient unable to adhere to initial POC   [x]Other: Pt has progressed well in PT. His R ankle ROM is WNL. His strength is improving to [facility]. His pain levels are minimal. He has improved his balance reactions to 10-15 sec on the RLE on level or unlevel surfaces. There is no instability noted in the ankle.  Pt is scheduled to return to his normal job duties tomorrow where he will be on his feet all day. Pt should be fine to do this but may have some temporary increased pain. Pt has been instructed in a HEP. Pt is appropriate for D/C from PT at this time. Date range of Visits: 22 - 3/7/22  Total Visits: 12    Recommendation:    [x] Discharge to HEP. Follow up with PT or MD PRN. Physical Therapy Treatment Note/ Progress Report:     Date:  3/7/2022    Patient Name:  Elza Carrillo    :  1995  MRN: [id number]  Restrictions/Precautions:    Medical/Treatment Diagnosis Information:  Diagnosis: S82.891A (ICD-10-CM) - Closed fracture of right ankle DOI 21  Treatment Diagnosis: R ankle pain, decreased ROM/ flexibility, decreased strength, decreased weight bearing, decreased balance and ambulation s/p R talar fracture and immobilization  Insurance/Certification information:  PT Insurance Information: [employer]; all codes billable; 30 PT pcy   (needs AUTH FOR F/U SESSIONS)  Physician Information:  Referring Practitioner: González Sood MD   (also send notes to ortho doc Stevenson Lombard, phone #[phone])    Plan of care signed (Y/N): [x]  Yes 22  []  No     Date of Patient follow up with Physician:      Progress Report: [x]  Yes  [x]  No     Date Range for reporting period:  Beginnin22  Ending:  3/7/22    Progress report due (10 Rx/or 30 days whichever is less):   3/7 (final session)  Recertification due (POC duration/ or 90 days whichever is less): n/a    Visit # Insurance Allowable Auth required?  Date Range   12 30 [x]  Yes  []  No pcy       Units approved Units used Date Range   60     39 (as of 3/7/22) 22- 22      Latex Allergy:  [x]NO      []YES  Preferred Language for Healthcare:   [x]English       []other:    Functional Scale:        Date assessed:  LEFS: raw score = 39; dysfunction = 51%   22  LEFS: raw score= 47; dysfunction=41%   22  LEFS: raw score=69;  Dysfunction=14%   3/7/22    Pain level:   0-1/10 R ankle     SUBJECTIVE:  Pt reports overall his injured ankle is doing better. Occasional soreness if on it a lot. Moved into a new apartment over the weekend so was carrying heavier objects/boxes up & down stairs. Had some discomfort in the R ankle but not much pain    OBJECTIVE: see pn above  RESTRICTIONS/PRECAUTIONS: Pt is allowed to be FWB as of 1/7      Exercises/Interventions:   Therapeutic Exercise (96631) x 15' Resistance / level Sets/time Reps Notes / Cues    IB stretch standing gastroc  soleus 30\"  30\" 3  1    HR-TR (double leg) standing 1 15     R HR & lower  2 5 R Concentric single leg   ANKLE ROM  Alphabet  DF/PF   INV/ EV --  --    BAPS board PWB RLE in standing  DF/PF, INV/EV  CW/CCW       R ankle 3 way TB (long sit) DF, INV, EV 2 15 R ea way 2/28 ^ resistance    Seated EV to INV towel sweeps     [place] board 2/16 added FWB-fatigues    Manual isometrics R toe flexors  2/5\" 6     Leg press  Leg #  Calf DL 90#`   Calf SL 40# 2  2  2 10  10  10 R 3/7 ^ wt                               Therapeutic Activities (07636) x           --  2/16: working on inc stride length and push off with R foot          SEE OBJ MEASURES & PATIENT ED  15'  3/7                 Neuromuscular Re-ed (65645) x 20'       Tandem stance    Airex   15\"   2 R/L    SLB Slow march on Airex     Fwd step up to SLS    Lateral step up to SLS    Fwd step down-R foot on step 8 inch 1 10 3/7 ^ step ht.    Lunge balance on BOSU  1/5\" 10 R    Fwd step up to SLB balance on BOSU Blue side up 1/5\" 12 R    Lateral shifts balance on BOSU Black side up 1 20 R/L 3/2   DL squat balance on BOSU Black side up 2 10 3/7 ^ sets   Chair squats to 20\"     Rocker board No UE assist for DLS         lat heel taps (R foot on step) w/ TRX 4\" fwd step up & over (R foot on step)    TRX squats           2/28 pt able to do without L foot touching floor   Steamboats (3 way) in stance on R foot with LLE kicks 2/28 ^ reps   Side lunge to push off to SLB  yellow sport card x 2 1/5\"  10 R/L 3/2 ^ resistance   KB deep squats 4 way resisted walkouts               Manual Intervention ([phone]) x 5'               PROM & gentle OP Joint mobs Gr IV distraction and post talar glide;   5' STM IASTM     K tape to R ankle                Modalities:     Pt. Education:  3/7/22: reassessed for D/C prog not. Reissued LEFS. Advised pt to continue HEP especially stretches after his work shift. REcommended managing any residual pain or swelling with elevation & CP prn when he returns to work. Also recommended he wear his steel toe high top shoes to work. 2/14/22: Reassessed pt for prog note. Reissued LEFS. Instructed pt to keep working on increasing his walking/standing tolerance. Instructed pt to work on single leg balance activities in [address] now and work on maintaining arch in stance. Discussed continuing PT another 3 wks and then hopefully pt will be ok for RTW.  2/2/22: Advised pt to try to build up his standing tolerance work on at least an hour at a time if tolerated. 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 and if he is no longer on narcotic pain meds  -pt educated on diagnosis, prognosis and expectations for rehab  -all pt questions were answered    Home Exercise Program:  Patient was instructed in the following exercises, performed them in the clinic and was issued a handout:     Access Code: [email or web address]  URL: [employer]/  Date: 01/12/2022  Prepared by:  Monica Alarcon  Exercises  Seated Calf Towel Stretch - 2 x daily - 7 x weekly - 5 reps - 20-30 seconds hold  Seated Heel Raise - 1-2 x daily - 5-7 x weekly - 2-3 sets - 10 reps  Seated Heel Toe Raises - 1-2 x daily - 5-7 x weekly - 2-3 sets - 10 reps  Seated Knee Flexion Stretch - 1-2 x daily - 5-7 x weekly - 10 reps - 10 seconds hold  Standing Weight Shift - 1-2 x daily - 5-7 x weekly - 2-3 sets - 10 reps  Standing Weight Shifting Forward and Backward (in stagger stance) - 1-2 x daily - 7 x weekly - 2-3 sets - 10 reps    Date: 2/7/22: Instructed pt to progress to standing B heel raises and eccentric lowering from R-HR    Date: 2/9/22: Instructed to add active toe flex /ext and toe scrunches with towel    Date: 2/14/22: Instructed pt to add standing runners calf stretch       Therapeutic Exercise and NMR EXR  [x] (70133) Provided verbal/tactile cueing for activities related to strengthening, flexibility, endurance, ROM for improvements in LE, proximal hip, and core control with self care, mobility, lifting, ambulation. [x] (31718) Provided verbal/tactile cueing for activities related to improving balance, coordination, kinesthetic sense, posture, motor skill, proprioception  to assist with LE, proximal hip, and core control in self care, mobility, lifting, ambulation and eccentric single leg control.   [] (62455) Therapist is in constant attendance of 2 or more patients providing skilled therapy interventions, but not providing any significant amount of measurable one-on-one time to either patient, for improvements in LE, proximal hip, and core control in self care, mobility, lifting, ambulation and eccentric single leg control.      NMR and Therapeutic Activities:    [x] (90914 or 77065) Provided verbal/tactile cueing for activities related to improving balance, coordination, kinesthetic sense, posture, motor skill, proprioception and motor activation to allow for proper function of core, proximal hip and LE with self care and ADLs  [] (90994) Gait Re-education- Provided training and instruction to the patient for proper LE, core and proximal hip recruitment and positioning and eccentric body weight control with ambulation re-education including up and down stairs     Home Exercise Program:    [x] (35035) Reviewed/Progressed HEP activities related to strengthening, flexibility, endurance, ROM of core, proximal hip and LE for functional self-care, mobility, lifting and ambulation/stair navigation   [] (40456)Reviewed/Progressed HEP activities related to improving balance, coordination, kinesthetic sense, posture, motor skill, proprioception of core, proximal hip and LE for self care, mobility, lifting, and ambulation/stair navigation      Manual Treatments:  PROM / STM / Oscillations-Mobs:  G-I, II, III, IV (PA's, Inf., Post.)  [x] (79360) Provided manual therapy to mobilize LE, proximal hip and/or LS spine soft tissue/joints for the purpose of modulating pain, promoting relaxation,  increasing ROM, reducing/eliminating soft tissue swelling/inflammation/restriction, improving soft tissue extensibility and allowing for proper ROM for normal function with self care, mobility, lifting and ambulation. Modalities:  [] (45941) Vasopneumatic compression: Utilized vasopneumatic compression to decrease edema / swelling for the purpose of improving mobility and quad tone / recruitment which will allow for increased overall function including but not limited to self-care, transfers, ambulation, and ascending / descending stairs. Charges:  Timed Code Treatment Minutes: 50   Total Treatment Minutes: 50     [] EVAL - LOW (98639)   [] EVAL - MOD (09952)  [] EVAL - HIGH (21587)  [] RE-EVAL (42535)  [x] TF(81850) x  1     [] Ionto  [x] NMR (78374) x 1      [] Vaso  [] Manual (07765) x       [] Ultrasound  [x] TA x 1       [] Mech Traction (95329)  [] Aquatic Therapy x      [] ES (un) (23984):   [] Home Management Training x  [] ES(attended) (93976)   [] Dry Needling 1-2 muscles (96414):  [] Dry Needling 3+ muscles (594177  [] Group:      [] Other: GAIT TRAINING (09410)    GOALS:   Patient stated goal: regain strength in order to return to work  []? Progressing: [x]? Met: []? Not Met: []? Adjusted     Therapist goals for Patient:   Short Term Goals: To be achieved in: 2 weeks  1. Independent in HEP and progression per patient tolerance, in order to prevent re-injury. []? Progressing: [x]? Met: []?  Not Met: []? Adjusted  2. Patient will have a decrease in pain of R ankle and decreased fear of wt bearing so he can walk in a normal gym shoe to facilitate improvement in movement, function, and ADLs as indicated by Functional Deficits. []? Progressing: [x]? Met: []? Not Met: []? Adjusted     Long Term Goals: To be achieved in: 6-8 weeks  1. Disability index score of 20% or less for the LEFS to assist with reaching prior level of function. []? Progressing: [x]? Met: []? Not Met: []? Adjusted  2. Patient will demonstrate increased AROM of R ankle DF and PF by at least 10 deg to allow for proper joint functioning for correct heel strike and push off during gait  []? Progressing: [x]? Met: []? Not Met: []? Adjusted  3. Patient will demonstrate an increase in Strength to at least 4+/5 in R ankle as well as good proximal hip strength and control to allow ability to ascend/descend stairs and ladders without difficulty  [x]? Progressing: []? Met: []? Not Met: []? Adjusted  4. Patient will return to functional activities including being able to stand at least 2 hours without increased symptoms or restriction. [x]? Progressing: []? Met: []? Not Met: []? Adjusted  5. Pt will be able to squat and lift heavier objects and walk without significant gait deviation on level and unlevel surfaces   []? Progressing: [x]? Met: []? Not Met: []? Adjusted            Overall Progression Towards Functional goals/ Treatment Progress Update:  [x] Patient is progressing as expected towards functional goals listed. [] Progression is slowed due to complexities/Impairments listed. [] Progression has been slowed due to co-morbidities.   [] Plan just implemented, too soon to assess goals progression <30days    [] Goals require adjustment due to lack of progress  [] Patient is not progressing as expected and requires additional follow up with physician  [] Other    Persisting Functional Limitations/Impairments:  []Sitting []Standing   []Walking [x]Stairs   []Transfers []ADLs   []Squatting/bending []Kneeling  []Housework []Job related tasks  []Driving []Sports/Recreation   []Sleeping []Other:    ASSESSMENT:  See prog note above. Treatment/Activity Tolerance:  [x] Pt able to complete treatment [] Patient limited by fatique  [] Patient limited by pain  [] Patient limited by other medical complications  [] Other:     Prognosis:    [x] Good [] Fair  [] Poor    Patient Requires Follow-up: [] Yes  [x] No    Return to Play:    [x]  N/A      PLAN:     [] Continue per plan of care [] Alter current plan (see comments)  [] Plan of care initiated [] Hold pending MD visit [x] Discharge    Electronically signed by: Rukhsana Boogie PT / DPT      Note: If patient does not return for scheduled/ recommended follow up visits, this note will serve as a discharge from care along with most recent update on progress.

## 2023-08-13 SDOH — HEALTH STABILITY: PHYSICAL HEALTH: ON AVERAGE, HOW MANY MINUTES DO YOU ENGAGE IN EXERCISE AT THIS LEVEL?: 150+ MIN

## 2023-08-13 SDOH — HEALTH STABILITY: PHYSICAL HEALTH: ON AVERAGE, HOW MANY DAYS PER WEEK DO YOU ENGAGE IN MODERATE TO STRENUOUS EXERCISE (LIKE A BRISK WALK)?: 6 DAYS

## 2023-08-14 ENCOUNTER — OFFICE VISIT (OUTPATIENT)
Dept: INTERNAL MEDICINE CLINIC | Age: 28
End: 2023-08-14

## 2023-08-14 VITALS
BODY MASS INDEX: 37.35 KG/M2 | SYSTOLIC BLOOD PRESSURE: 150 MMHG | HEART RATE: 97 BPM | DIASTOLIC BLOOD PRESSURE: 110 MMHG | OXYGEN SATURATION: 98 % | WEIGHT: 291 LBS | HEIGHT: 74 IN

## 2023-08-14 DIAGNOSIS — Z00.00 PREVENTATIVE HEALTH CARE: ICD-10-CM

## 2023-08-14 DIAGNOSIS — R03.0 ELEVATED BLOOD PRESSURE READING IN OFFICE WITH WHITE COAT SYNDROME, WITHOUT DIAGNOSIS OF HYPERTENSION: ICD-10-CM

## 2023-08-14 DIAGNOSIS — K76.0 FATTY LIVER: ICD-10-CM

## 2023-08-14 DIAGNOSIS — Z00.00 PREVENTATIVE HEALTH CARE: Primary | ICD-10-CM

## 2023-08-14 DIAGNOSIS — Z13.220 SCREENING FOR CHOLESTEROL LEVEL: ICD-10-CM

## 2023-08-14 RX ORDER — BLOOD PRESSURE TEST KIT
1 KIT MISCELLANEOUS DAILY
Qty: 1 KIT | Refills: 0 | Status: SHIPPED | OUTPATIENT
Start: 2023-08-14

## 2023-08-14 ASSESSMENT — ENCOUNTER SYMPTOMS
CHEST TIGHTNESS: 0
BLOOD IN STOOL: 0
COUGH: 0
CONSTIPATION: 0
WHEEZING: 0
SINUS PAIN: 0
ABDOMINAL PAIN: 0
SHORTNESS OF BREATH: 0
COLOR CHANGE: 0

## 2023-08-14 ASSESSMENT — PATIENT HEALTH QUESTIONNAIRE - PHQ9
SUM OF ALL RESPONSES TO PHQ QUESTIONS 1-9: 0
SUM OF ALL RESPONSES TO PHQ9 QUESTIONS 1 & 2: 0
SUM OF ALL RESPONSES TO PHQ QUESTIONS 1-9: 0
1. LITTLE INTEREST OR PLEASURE IN DOING THINGS: 0
SUM OF ALL RESPONSES TO PHQ QUESTIONS 1-9: 0
SUM OF ALL RESPONSES TO PHQ QUESTIONS 1-9: 0
2. FEELING DOWN, DEPRESSED OR HOPELESS: 0

## 2023-08-14 NOTE — PROGRESS NOTES
Addison Hidalgo (:  1995) is a 29 y.o. male,Established patient, here for evaluation of the following chief complaint(s):  New Patient and Toe Pain (Left 4th digit crosses middle toe causes discomfort. )         ASSESSMENT/PLAN:  1. Preventative health care  -     TSH; Future  -     CBC with Auto Differential; Future  -     Hemoglobin A1C; Future  -     Comprehensive Metabolic Panel; Future  2. Elevated blood pressure reading in office with white coat syndrome, without diagnosis of hypertension  -     Blood Pressure KIT; DAILY Starting Mon 2023, Disp-1 kit, R-0, Normal  3. Screening for cholesterol level  -     Lipid Panel; Future  4. Fatty liver    Patient is current medical issues the patient is looking to work on his lifestyle changes including weight loss we discussed different ways of doing especially dietary control and limiting himself to 2000 fabián/day in addition to exercising regular basis    The patient did have history of fatty liver on his previous CAT scans hopefully that has improved but that the best way to fix that would be with lifestyle changes as described above    Patient did have high blood pressure on initial presentation he also had high readings the past and he reports that this is a common thing for him that he would have every time he goes to the doctor office        The patient have whitecoat syndrome versus actual hypertension so can have him check his blood pressure at home at least once a day when he is rested in a seated or laying down position for 5 to 10 minutes with a good-sized arm cuff and to keep a log of that and bring it with him next time with his blood pressure machine so that we can measure it against our machine here and decide if any adjustment is needed  He is to follow a low-fat low-salt diet and will reassess in about 4 weeks  Return in about 4 weeks (around 2023).          Subjective   SUBJECTIVE/OBJECTIVE:    Lab Review   No results found for: NA, K,

## 2023-08-15 LAB
ALBUMIN SERPL-MCNC: 4.9 G/DL (ref 3.4–5)
ALBUMIN/GLOB SERPL: 1.8 {RATIO} (ref 1.1–2.2)
ALP SERPL-CCNC: 59 U/L (ref 40–129)
ALT SERPL-CCNC: 52 U/L (ref 10–40)
ANION GAP SERPL CALCULATED.3IONS-SCNC: 16 MMOL/L (ref 3–16)
AST SERPL-CCNC: <5 U/L (ref 15–37)
BASOPHILS # BLD: 0 K/UL (ref 0–0.2)
BASOPHILS NFR BLD: 0.3 %
BILIRUB SERPL-MCNC: 0.4 MG/DL (ref 0–1)
BUN SERPL-MCNC: 16 MG/DL (ref 7–20)
CALCIUM SERPL-MCNC: 9.8 MG/DL (ref 8.3–10.6)
CHLORIDE SERPL-SCNC: 95 MMOL/L (ref 99–110)
CHOLEST SERPL-MCNC: 251 MG/DL (ref 0–199)
CO2 SERPL-SCNC: 22 MMOL/L (ref 21–32)
CREAT SERPL-MCNC: 0.8 MG/DL (ref 0.9–1.3)
DEPRECATED RDW RBC AUTO: 13.2 % (ref 12.4–15.4)
EOSINOPHIL # BLD: 0.2 K/UL (ref 0–0.6)
EOSINOPHIL NFR BLD: 2.2 %
EST. AVERAGE GLUCOSE BLD GHB EST-MCNC: 263.3 MG/DL
GFR SERPLBLD CREATININE-BSD FMLA CKD-EPI: >60 ML/MIN/{1.73_M2}
GLUCOSE SERPL-MCNC: 347 MG/DL (ref 70–99)
HBA1C MFR BLD: 10.8 %
HCT VFR BLD AUTO: 46.9 % (ref 40.5–52.5)
HDLC SERPL-MCNC: 24 MG/DL (ref 40–60)
HGB BLD-MCNC: 16.6 G/DL (ref 13.5–17.5)
LDLC SERPL CALC-MCNC: ABNORMAL MG/DL
LDLC SERPL-MCNC: 119 MG/DL
LYMPHOCYTES # BLD: 1.9 K/UL (ref 1–5.1)
LYMPHOCYTES NFR BLD: 24.2 %
MCH RBC QN AUTO: 31.8 PG (ref 26–34)
MCHC RBC AUTO-ENTMCNC: 35.5 G/DL (ref 31–36)
MCV RBC AUTO: 89.6 FL (ref 80–100)
MONOCYTES # BLD: 0.6 K/UL (ref 0–1.3)
MONOCYTES NFR BLD: 7.4 %
NEUTROPHILS # BLD: 5.1 K/UL (ref 1.7–7.7)
NEUTROPHILS NFR BLD: 65.9 %
PLATELET # BLD AUTO: 245 K/UL (ref 135–450)
PMV BLD AUTO: 10.6 FL (ref 5–10.5)
POTASSIUM SERPL-SCNC: 4.3 MMOL/L (ref 3.5–5.1)
PROT SERPL-MCNC: 7.6 G/DL (ref 6.4–8.2)
RBC # BLD AUTO: 5.23 M/UL (ref 4.2–5.9)
SODIUM SERPL-SCNC: 133 MMOL/L (ref 136–145)
TRIGL SERPL-MCNC: 772 MG/DL (ref 0–150)
TSH SERPL DL<=0.005 MIU/L-ACNC: 1.23 UIU/ML (ref 0.27–4.2)
VLDLC SERPL CALC-MCNC: ABNORMAL MG/DL
WBC # BLD AUTO: 7.7 K/UL (ref 4–11)

## 2023-08-16 NOTE — RESULT ENCOUNTER NOTE
Please let the patient know that results show that his blood sugars is significantly elevated and he is in the diabetes category now, please bring the patient back and that we can go over his new diagnosis of diabetes and the treatment for it as well as reevaluate his blood pressure and cholesterol all of which will need to be treated differently now

## 2025-05-14 ENCOUNTER — TELEPHONE (OUTPATIENT)
Dept: INTERNAL MEDICINE CLINIC | Age: 30
End: 2025-05-14